# Patient Record
Sex: FEMALE | Race: WHITE | HISPANIC OR LATINO
[De-identification: names, ages, dates, MRNs, and addresses within clinical notes are randomized per-mention and may not be internally consistent; named-entity substitution may affect disease eponyms.]

---

## 2020-09-29 ENCOUNTER — APPOINTMENT (OUTPATIENT)
Dept: DERMATOLOGY | Facility: CLINIC | Age: 60
End: 2020-09-29
Payer: COMMERCIAL

## 2020-09-29 ENCOUNTER — TRANSCRIPTION ENCOUNTER (OUTPATIENT)
Age: 60
End: 2020-09-29

## 2020-09-29 PROCEDURE — 99203 OFFICE O/P NEW LOW 30 MIN: CPT | Mod: 95

## 2020-09-29 RX ORDER — BETAMETHASONE DIPROPIONATE 0.5 MG/G
0.05 OINTMENT, AUGMENTED TOPICAL
Qty: 1 | Refills: 0 | Status: ACTIVE | COMMUNITY
Start: 2020-09-29 | End: 1900-01-01

## 2020-11-09 ENCOUNTER — APPOINTMENT (OUTPATIENT)
Dept: DERMATOLOGY | Facility: CLINIC | Age: 60
End: 2020-11-09
Payer: COMMERCIAL

## 2020-11-09 PROCEDURE — 99213 OFFICE O/P EST LOW 20 MIN: CPT

## 2020-11-09 PROCEDURE — 99072 ADDL SUPL MATRL&STAF TM PHE: CPT

## 2020-11-09 RX ORDER — DICLOFENAC SODIUM 1% 10 MG/G
1 GEL TOPICAL
Qty: 100 | Refills: 0 | Status: ACTIVE | COMMUNITY
Start: 2020-05-21

## 2021-03-09 ENCOUNTER — NON-APPOINTMENT (OUTPATIENT)
Age: 61
End: 2021-03-09

## 2021-03-10 ENCOUNTER — APPOINTMENT (OUTPATIENT)
Dept: DERMATOLOGY | Facility: CLINIC | Age: 61
End: 2021-03-10
Payer: COMMERCIAL

## 2021-03-10 DIAGNOSIS — L63.9 ALOPECIA AREATA, UNSPECIFIED: ICD-10-CM

## 2021-03-10 DIAGNOSIS — L21.9 SEBORRHEIC DERMATITIS, UNSPECIFIED: ICD-10-CM

## 2021-03-10 PROCEDURE — 99214 OFFICE O/P EST MOD 30 MIN: CPT

## 2021-03-10 PROCEDURE — 99072 ADDL SUPL MATRL&STAF TM PHE: CPT

## 2021-07-22 ENCOUNTER — RX CHANGE (OUTPATIENT)
Age: 61
End: 2021-07-22

## 2021-09-01 DIAGNOSIS — M70.71 OTHER BURSITIS OF HIP, RIGHT HIP: ICD-10-CM

## 2021-09-01 DIAGNOSIS — G57.00 LESION OF SCIATIC NERVE, UNSPECIFIED LOWER LIMB: ICD-10-CM

## 2021-09-01 DIAGNOSIS — M25.569 PAIN IN UNSPECIFIED KNEE: ICD-10-CM

## 2021-09-01 DIAGNOSIS — M25.519 PAIN IN UNSPECIFIED SHOULDER: ICD-10-CM

## 2021-09-01 DIAGNOSIS — Z83.6 FAMILY HISTORY OF OTHER DISEASES OF THE RESPIRATORY SYSTEM: ICD-10-CM

## 2021-09-01 DIAGNOSIS — R51.9 HEADACHE, UNSPECIFIED: ICD-10-CM

## 2021-09-01 DIAGNOSIS — M54.2 CERVICALGIA: ICD-10-CM

## 2021-09-01 RX ORDER — OMEPRAZOLE 40 MG/1
40 CAPSULE, DELAYED RELEASE ORAL
Refills: 0 | Status: ACTIVE | COMMUNITY

## 2021-09-01 RX ORDER — OMEPRAZOLE 20 MG/1
20 CAPSULE, DELAYED RELEASE ORAL
Refills: 0 | Status: ACTIVE | COMMUNITY

## 2021-09-02 ENCOUNTER — APPOINTMENT (OUTPATIENT)
Dept: PHYSICAL MEDICINE AND REHAB | Facility: CLINIC | Age: 61
End: 2021-09-02
Payer: COMMERCIAL

## 2021-09-02 VITALS
BODY MASS INDEX: 30.9 KG/M2 | RESPIRATION RATE: 16 BRPM | DIASTOLIC BLOOD PRESSURE: 74 MMHG | WEIGHT: 181 LBS | OXYGEN SATURATION: 95 % | TEMPERATURE: 96.8 F | HEART RATE: 113 BPM | HEIGHT: 64 IN | SYSTOLIC BLOOD PRESSURE: 160 MMHG

## 2021-09-02 PROCEDURE — 99213 OFFICE O/P EST LOW 20 MIN: CPT

## 2021-09-04 NOTE — REVIEW OF SYSTEMS
[Muscle Pain] : muscle pain [Fever] : no fever [Eye Pain] : no eye pain [Earache] : no earache [Chest Pain] : no chest pain [Shortness Of Breath] : no shortness of breath [Abdominal Pain] : no abdominal pain [Dysuria] : no dysuria [Skin Rash] : no skin rash [Dizziness] : no dizziness [Anxiety] : no anxiety [Easy Bruising] : no tendency for easy bruising [de-identified] : thoracic mylopathy with LE spasticity and weakness.

## 2021-09-04 NOTE — HISTORY OF PRESENT ILLNESS
[FreeTextEntry1] : Leg spasms\par This is a follow up visit for a 59 year old female who is well know to the practice. 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 She has residual lower extremity weakness with bilateral lower extremity spasticity. The spasticity has been controlled with Baclofen 20 mg 1 po qid.  She has difficulty with walking. She uses bilateral KAFO and  bilateral lofstrand crutches to assist with ambulation.\par \par . abram larson  adjusted for ankle release+ discussed ergonomics of computer work station. 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 She recently received new TKAFOs due to weight loss by Prosthetics P+O with Abundio Yun.  She has difficulty with ambulation because the ankle components are lockes at 90 degress and does not provide dorsiflexion to stand not clear stairs. The foot plate is to the toes and effectively makes the kafo seem longer. She catches her toe on stairs because she cannot hike her hip high enough to clear.\par \par She had a spasm on the thigh. her neurologist trialed tizanidine 2 mg at night. Tizandine made her high.h\par She received a new MRI of the thoracic spine. The result si not availabel for my review. She was told that the cord was thinned at the point of surgery\par \par She still requires baclofen for spasticity control. \par \par She uses the orthotics throughout the home. She resorts to a wheelchair when she tired. \par

## 2021-09-04 NOTE — PHYSICAL EXAM
[FreeTextEntry1] : Neck:\par     no masses, thyromegaly, or abnormal cervical nodes. moderate spasm on the right paracervical muscles. negative spurlings. \par Thoracic spine: moderate spasm in the paraspinal muscles.\par     Lumbar mild spasm. forward flexion 0-70.\par Pulses:\par     pulses normal in all 4 extremities.  \par Extremities:\par     RUE:FAROM shoulder tender diffusely MS 4/5 FAROM, Muscle strength 4/5 (nl 5/5), reflexes 2/4 sensation intact to light touch, pinprick and proprioception  \par \par LUE:FAROM shoulder tender diffusely MS 4/5 FAROM, Muscle strength 4/5 (nl 5/5), reflexes 2/4 sensation intact to light touch, pinprick and proprioception  \par \par LLE: hip 0-50  ms 3/5  (nl 5/5)\par knee farom ms 3/5   (nl 5/5) reflex 2/4 well healed scar. no instability.\par ankle FAROM, Muscle strength 2/5 (nl 5/5), reflexes 2/4 sensation intact to light touch, pinprick and proprioception. Skin intact\par  negative FABERs, negative FADIRs, negative SLRs. Cassidy 3/4\par \par RLE: hip flexion 0-60 ms 3/5  (nl 5/5)\par knee flex 0-30 ms 3/5  (nl 5/5) . no instability. No swell + medial mcmurrays reflex 3/4\par ankle FAROM, Muscle strength 2/5  (nl 5/5) , reflexes 3/4 sensation intact to light touch, pinprick and proprioception. Skin intact.\par negative FABERs, negative FADIRs, negative SLRs Cassidy 2/4\par \par Neurologic:\par     Gait, She is distant supervision to rise from a chair. She thrusts herself forward.  She circumducts her legs to bring her legs forward.  She also uses right KAFO and left KAFO bilateral lofstrand crutches to assist with ambulation.\par

## 2021-09-20 ENCOUNTER — RX CHANGE (OUTPATIENT)
Age: 61
End: 2021-09-20

## 2022-05-01 ENCOUNTER — RX RENEWAL (OUTPATIENT)
Age: 62
End: 2022-05-01

## 2022-05-01 ENCOUNTER — NON-APPOINTMENT (OUTPATIENT)
Age: 62
End: 2022-05-01

## 2022-05-02 ENCOUNTER — APPOINTMENT (OUTPATIENT)
Dept: PHYSICAL MEDICINE AND REHAB | Facility: CLINIC | Age: 62
End: 2022-05-02
Payer: COMMERCIAL

## 2022-05-02 VITALS
RESPIRATION RATE: 16 BRPM | HEART RATE: 101 BPM | OXYGEN SATURATION: 97 % | BODY MASS INDEX: 31.58 KG/M2 | WEIGHT: 185 LBS | HEIGHT: 64 IN | TEMPERATURE: 97.8 F | SYSTOLIC BLOOD PRESSURE: 126 MMHG | DIASTOLIC BLOOD PRESSURE: 78 MMHG

## 2022-05-02 DIAGNOSIS — M76.32 ILIOTIBIAL BAND SYNDROME, LEFT LEG: ICD-10-CM

## 2022-05-02 PROCEDURE — 99213 OFFICE O/P EST LOW 20 MIN: CPT

## 2022-05-03 PROBLEM — M76.32 ILIOTIBIAL BAND SYNDROME OF LEFT SIDE: Status: ACTIVE | Noted: 2022-05-02

## 2022-05-03 NOTE — REVIEW OF SYSTEMS
[Muscle Pain] : muscle pain [Fever] : no fever [Eye Pain] : no eye pain [Earache] : no earache [Chest Pain] : no chest pain [Shortness Of Breath] : no shortness of breath [Abdominal Pain] : no abdominal pain [Dysuria] : no dysuria [Skin Rash] : no skin rash [Dizziness] : no dizziness [Anxiety] : no anxiety [Easy Bruising] : no tendency for easy bruising [de-identified] : thoracic mylopathy with LE spasticity and weakness.

## 2022-05-03 NOTE — HISTORY OF PRESENT ILLNESS
[FreeTextEntry1] : Leg spasms\par This is a follow up visit for a 62 year old female who is well know to the practice. 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 She has residual lower extremity weakness with bilateral lower extremity spasticity. The spasticity has been controlled with Baclofen 20 mg 1 po qid.  She has difficulty with walking. She uses bilateral KAFO and  bilateral lofstrand crutches to assist with ambulation. \par \par She is still having difficulties with the use of her bilateral TK AFO orthosis.  Many of her concerns have been addressed by macario Yun.  She still has a long footplate which prevents her from using her shoes.  It is taken a long time for her to adjust to the new braces.  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  The results are not available for my review. She was told that the cord was thinned at the point of surgery\par \par She has increased pain over the left lateral leg that starts in the hip and radiates to the knee.  The pain is worse with use of her prosthesis.  She notices the pain more at night.  Pain is 5/10\par \par She still requires baclofen for spasticity control.  She takes baclofen 20 mg 1 p.o. 4 times daily\par \par She uses the orthotics throughout the home. She resorts to a wheelchair when she tired.  She continues to be gainfully employed\par

## 2022-05-31 ENCOUNTER — RX RENEWAL (OUTPATIENT)
Age: 62
End: 2022-05-31

## 2022-05-31 RX ORDER — MELOXICAM 15 MG/1
15 TABLET ORAL
Qty: 30 | Refills: 0 | Status: ACTIVE | COMMUNITY
Start: 2022-05-02 | End: 1900-01-01

## 2022-06-02 ENCOUNTER — RX CHANGE (OUTPATIENT)
Age: 62
End: 2022-06-02

## 2022-06-02 RX ORDER — BACLOFEN 20 MG/1
20 TABLET ORAL
Qty: 120 | Refills: 5 | Status: DISCONTINUED | COMMUNITY
Start: 2021-06-28 | End: 2022-06-02

## 2022-11-02 ENCOUNTER — APPOINTMENT (OUTPATIENT)
Dept: PHYSICAL MEDICINE AND REHAB | Facility: CLINIC | Age: 62
End: 2022-11-02

## 2022-11-02 VITALS
RESPIRATION RATE: 18 BRPM | DIASTOLIC BLOOD PRESSURE: 80 MMHG | TEMPERATURE: 97.6 F | BODY MASS INDEX: 31.58 KG/M2 | SYSTOLIC BLOOD PRESSURE: 118 MMHG | OXYGEN SATURATION: 97 % | HEIGHT: 64 IN | WEIGHT: 185 LBS

## 2022-11-02 PROCEDURE — 99213 OFFICE O/P EST LOW 20 MIN: CPT

## 2022-11-02 RX ORDER — BACLOFEN 20 MG/1
20 TABLET ORAL
Qty: 120 | Refills: 6 | Status: DISCONTINUED | COMMUNITY
Start: 2021-09-02 | End: 2022-11-02

## 2022-11-02 RX ORDER — BACLOFEN 20 MG/1
20 TABLET ORAL
Qty: 120 | Refills: 0 | Status: DISCONTINUED | COMMUNITY
Start: 2020-05-14 | End: 2022-11-02

## 2022-11-06 NOTE — REVIEW OF SYSTEMS
[Muscle Pain] : muscle pain [Fever] : no fever [Eye Pain] : no eye pain [Earache] : no earache [Chest Pain] : no chest pain [Shortness Of Breath] : no shortness of breath [Abdominal Pain] : no abdominal pain [Dysuria] : no dysuria [Skin Rash] : no skin rash [Dizziness] : no dizziness [Anxiety] : no anxiety [Easy Bruising] : no tendency for easy bruising [de-identified] : thoracic mylopathy with LE spasticity and weakness.

## 2022-11-06 NOTE — HISTORY OF PRESENT ILLNESS
[FreeTextEntry1] : The patient follows up for renewal of her medications for control of low extremity spasticity and to evaluate her new TKAFOs\par \par This is a follow up visit for a 62 year old female who is well know to the practice. 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 She has residual lower extremity weakness with bilateral lower extremity spasticity. The spasticity has been controlled with Baclofen 20 mg 1 po qid.  She has difficulty with walking. She uses bilateral KAFO and  bilateral lofstrand crutches to assist with ambulation. \par \par She has new TKAFOs.  The new braces provide better support.  She no longer has knock knee. \par \par She requires restorative physical therapy to acclimate her to her new braces she is in restorative physical therapy therapy 2 times a week. week exercises include Bridges, adductor exercises,  knee extensions over a pilon.  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 \par \par She received a new MRI of the thoracic spine.  The results are not available for my review. She was told that the cord was thinned at the point of surgery.  No further surgery is anticipated\par \par She still requires baclofen for spasticity control.  She takes baclofen 20 mg 1 p.o. 4 times daily\par \par She uses the orthotics throughout the home. She resorts to a wheelchair when she tired.  She continues to be gainfully employed\par \par

## 2022-11-06 NOTE — PHYSICAL EXAM
[FreeTextEntry1] : Neck:\par     no masses, thyromegaly, or abnormal cervical nodes. moderate spasm on the right paracervical muscles. negative spurlings. \par Thoracic spine: moderate spasm in the paraspinal muscles.\par     Lumbar mild spasm. forward flexion 0-70.\par Pulses:\par     pulses normal in all 4 extremities.  \par Extremities:\par     RUE:FAROM shoulder tender diffusely MS 4/5 FAROM, Muscle strength 4/5 (nl 5/5), reflexes 2/4 sensation intact to light touch, pinprick and proprioception  \par \par LUE:FAROM shoulder tender diffusely MS 4/5 FAROM, Muscle strength 4/5 (nl 5/5), reflexes 2/4 sensation intact to light touch, pinprick and proprioception  \par \par LLE: hip 0-50  ms 3/5  (nl 5/5)\par knee farom ms 3/5   (nl 5/5) reflex 2/4 well healed scar. no instability.\par ankle FAROM, Muscle strength 2/5 (nl 5/5), reflexes 2/4 sensation intact to light touch, pinprick and proprioception. Skin intact\par  negative FABERs, negative FADIRs, negative SLRs. Cassidy 2/4\par \par RLE: hip flexion 0-60 ms 3/5  (nl 5/5)\par knee flex 0-30 ms 3/5  (nl 5/5) . no instability. No swell + medial mcmurrays reflex 3/4\par ankle FAROM, Muscle strength 2/5  (nl 5/5) , reflexes 3/4 sensation intact to light touch, pinprick and proprioception. Skin intact.\par negative FABERs, negative FADIRs, negative SLRs Cassidy 2/4\par \par Neurologic:\par     Gait, She is distant supervision to rise from a chair. She thrusts herself forward.  She is better able to use her hip flexors to clear her feet during swing through phase.  She wears bilateral KAFOs and uses bilateral lofstrand crutches to assist with ambulation.\par

## 2023-01-17 ENCOUNTER — NON-APPOINTMENT (OUTPATIENT)
Age: 63
End: 2023-01-17

## 2023-01-18 ENCOUNTER — APPOINTMENT (OUTPATIENT)
Dept: DERMATOLOGY | Facility: CLINIC | Age: 63
End: 2023-01-18
Payer: COMMERCIAL

## 2023-01-18 DIAGNOSIS — L65.0 TELOGEN EFFLUVIUM: ICD-10-CM

## 2023-01-18 DIAGNOSIS — L64.9 ANDROGENIC ALOPECIA, UNSPECIFIED: ICD-10-CM

## 2023-01-18 DIAGNOSIS — L21.9 SEBORRHEIC DERMATITIS, UNSPECIFIED: ICD-10-CM

## 2023-01-18 PROCEDURE — 99214 OFFICE O/P EST MOD 30 MIN: CPT

## 2023-01-18 RX ORDER — KETOCONAZOLE 20.5 MG/ML
2 SHAMPOO, SUSPENSION TOPICAL
Qty: 1 | Refills: 11 | Status: ACTIVE | COMMUNITY
Start: 2020-11-09 | End: 1900-01-01

## 2023-05-31 ENCOUNTER — APPOINTMENT (OUTPATIENT)
Dept: PHYSICAL MEDICINE AND REHAB | Facility: CLINIC | Age: 63
End: 2023-05-31
Payer: COMMERCIAL

## 2023-05-31 VITALS
TEMPERATURE: 98 F | HEIGHT: 64 IN | HEART RATE: 120 BPM | OXYGEN SATURATION: 98 % | RESPIRATION RATE: 17 BRPM | SYSTOLIC BLOOD PRESSURE: 120 MMHG | DIASTOLIC BLOOD PRESSURE: 80 MMHG

## 2023-05-31 DIAGNOSIS — M25.561 PAIN IN RIGHT KNEE: ICD-10-CM

## 2023-05-31 PROCEDURE — 99214 OFFICE O/P EST MOD 30 MIN: CPT

## 2023-06-04 PROBLEM — M25.561 ACUTE PAIN OF RIGHT KNEE: Status: ACTIVE | Noted: 2023-05-31

## 2023-06-04 NOTE — PHYSICAL EXAM
[FreeTextEntry1] : Neck:\par     no masses, thyromegaly, or abnormal cervical nodes. moderate spasm on the right paracervical muscles. negative spurlings. \par Thoracic spine: moderate spasm in the paraspinal muscles.\par     Lumbar mild spasm. forward flexion 0-70.\par Pulses:\par     pulses normal in all 4 extremities.  \par Extremities:\par     RUE:FAROM shoulder tender diffusely MS 4/5 FAROM, Muscle strength 4/5 (nl 5/5), reflexes 2/4 sensation intact to light touch, pinprick and proprioception  \par \par LUE:FAROM shoulder tender diffusely MS 4/5 FAROM, Muscle strength 4/5 (nl 5/5), reflexes 2/4 sensation intact to light touch, pinprick and proprioception  \par \par LLE: hip 0-50  ms 3/5  (nl 5/5)\par knee farom ms 3/5   (nl 5/5) reflex 2/4 well healed scar. no instability.\par ankle FAROM, Muscle strength 2/5 (nl 5/5), reflexes 2/4 sensation intact to light touch, pinprick and proprioception. Skin intact\par  negative FABERs, negative FADIRs, negative SLRs. Cassidy 2/4\par \par RLE: hip flexion 0-60 ms 3/5  (nl 5/5)\par knee flex 0-30 ms 3/5  (nl 5/5) . no instability. No swell + medial mcmurrays she has tenderness over the medial knee at the point of a palpable screw head.  Reflex 3/4\par ankle FAROM, Muscle strength 2/5  (nl 5/5) , reflexes 3/4 sensation intact to light touch, pinprick and proprioception. Skin intact.\par negative FABERs, negative FADIRs, negative SLRs Cassidy 2/4\par \par Neurologic:\par     Gait, She is distant contact-guard to rise from a chair. She thrusts herself forward.  She has difficulty with crossing her legs forward.  She wears bilateral KAFOs and uses bilateral lofstrand crutches to assist with ambulation.\par

## 2023-06-04 NOTE — HISTORY OF PRESENT ILLNESS
[FreeTextEntry1] : The patient follows up for renewal of her medications for control of low extremity spasticity and for prescription to modify her present T KAFOs\par \par This is a follow up visit for a 63 year old female who is well know to the practice. 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 She has residual lower extremity weakness with bilateral lower extremity spasticity. The spasticity has been controlled with Baclofen 20 mg 1 po qid.  She has difficulty with walking. She uses bilateral T KAFO and  bilateral lofstrand crutches to assist with ambulation. \par \par She received a new MRI of the thoracic spine.  The results are not available for my review. She was told that the cord was thinned at the point of surgery.  No further surgery is anticipated\par \par She still requires baclofen for spasticity control.  She takes baclofen 20 mg 1 p.o. 4 times daily\par \par She uses the orthotics throughout the home. She resorts to a wheelchair when she tired.  She continues to be gainfully employed\par \par \par She has difficulty with walking especially going up stairs.  Rising out of the chair has been more difficult recently.  She denies increased back pain.\par \par She has pain over the right knee.  This is a site of a surgical repair of fracture.  She feels a screw coming through the skin.  Skin is intact\par \par

## 2023-06-04 NOTE — REVIEW OF SYSTEMS
[Muscle Pain] : muscle pain [Fever] : no fever [Eye Pain] : no eye pain [Earache] : no earache [Chest Pain] : no chest pain [Shortness Of Breath] : no shortness of breath [Abdominal Pain] : no abdominal pain [Dysuria] : no dysuria [Skin Rash] : no skin rash [Dizziness] : no dizziness [Anxiety] : no anxiety [Easy Bruising] : no tendency for easy bruising [de-identified] : thoracic mylopathy with LE spasticity and weakness.

## 2023-07-25 ENCOUNTER — APPOINTMENT (OUTPATIENT)
Dept: PHYSICAL MEDICINE AND REHAB | Facility: CLINIC | Age: 63
End: 2023-07-25
Payer: COMMERCIAL

## 2023-07-25 VITALS
BODY MASS INDEX: 31.58 KG/M2 | HEART RATE: 107 BPM | WEIGHT: 185 LBS | SYSTOLIC BLOOD PRESSURE: 122 MMHG | HEIGHT: 64 IN | TEMPERATURE: 97.8 F | OXYGEN SATURATION: 97 % | RESPIRATION RATE: 17 BRPM | DIASTOLIC BLOOD PRESSURE: 70 MMHG

## 2023-07-25 PROCEDURE — 99214 OFFICE O/P EST MOD 30 MIN: CPT

## 2023-07-27 NOTE — PHYSICAL EXAM
[FreeTextEntry1] : Neck:\par     no masses, thyromegaly, or abnormal cervical nodes. moderate spasm on the right paracervical muscles. negative spurlings. \par Thoracic spine: moderate spasm in the paraspinal muscles.  Right worse than left\par     Lumbar mild spasm. forward flexion 0-70.\par Pulses:\par     pulses normal in all 4 extremities.  \par Extremities:\par     RUE:FAROM shoulder tender diffusely MS 4/5 FAROM, Muscle strength 4/5 (nl 5/5), reflexes 2/4 sensation intact to light touch, pinprick and proprioception  \par \par LUE:FAROM shoulder tender diffusely MS 4/5 FAROM, Muscle strength 4/5 (nl 5/5), reflexes 2/4 sensation intact to light touch, pinprick and proprioception  \par \par LLE: hip 0-50  ms 3/5  (nl 5/5)\par knee farom ms 3/5   (nl 5/5) reflex 2/4 well healed scar. no instability.\par ankle FAROM, Muscle strength 2/5 (nl 5/5), reflexes 2/4 sensation intact to light touch, pinprick and proprioception. Skin intact\par  negative FABERs, negative FADIRs, negative SLRs. Cassidy 2/4\par \par RLE: hip flexion 0-60 ms 3/5  (nl 5/5)\par knee flex 0-30 ms 3/5  (nl 5/5) . no instability. No swell + medial mcmurrays she has tenderness over the medial knee at the point of a palpable screw head.  Skin is intact reflex 3/4\par ankle FAROM, Muscle strength 2/5  (nl 5/5) , reflexes 3/4 sensation intact to light touch, pinprick and proprioception. Skin intact.\par negative FABERs, negative FADIRs, negative SLRs Cassidy 2/4\par \par Neurologic:\par     Gait, She is distant contact-guard to rise from a chair. She thrusts herself forward.  She has difficulty with crossing her legs forward.  She wears bilateral KAFOs and uses bilateral lofstrand crutches to assist with ambulation.  She has difficulty with hip flexion and is circumducting her legs to advance her steps\par

## 2023-07-27 NOTE — REVIEW OF SYSTEMS
[Muscle Pain] : muscle pain [Fever] : no fever [Eye Pain] : no eye pain [Earache] : no earache [Chest Pain] : no chest pain [Shortness Of Breath] : no shortness of breath [Abdominal Pain] : no abdominal pain [Dysuria] : no dysuria [Skin Rash] : no skin rash [Dizziness] : no dizziness [Anxiety] : no anxiety [Easy Bruising] : no tendency for easy bruising [de-identified] : thoracic mylopathy with LE spasticity and weakness.

## 2023-07-27 NOTE — HISTORY OF PRESENT ILLNESS
[FreeTextEntry1] : The patient follows up for renewal of her medications for control of low extremity spasticity and for prescription to modify her present T KAFOs\par \par This is a follow up visit for a 63 year old female who is well know to the practice. 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 She has residual lower extremity weakness with bilateral lower extremity spasticity. The spasticity has been controlled with Baclofen 20 mg 1 po qid.  She has difficulty with walking. She uses bilateral T KAFO and  bilateral lofstrand crutches to assist with ambulation. \par \par She received a new MRI of the thoracic spine.  The results are not available for my review. She was told that the cord was thinned at the point of surgery.  No further surgery is anticipated\par \par She still requires baclofen for spasticity control.  She takes baclofen 20 mg 1 p.o. 4 times daily\par \par She had an exacerbation of back pain June 2023\par She developed pain from the right shoulder to the right hip to the knee.  She had weakness of the knee.\par She went to therapy/ chiropracter.  Her pain has been decreasing. \par Her right kafo was too loose . She has been dragging the right brace.  The excessive work of dragging the right prosthesis is aggravated her back pain she followed up with P+O. The orthotist adjusted the straps. She needed to pay for housekeeprs to perform her chores. She has been restricting her activities including ADLs and ambulation to allow her right side pain to resolve. She needs lighter T KAFOs.  The T KAFOs have to be a compromise to allow for a balance of control of the hip and knees and ankle and the lighter weight.  As she ages she finds that she is unable to propel the heavy braces\par \par She uses the orthotics throughout the home. She resorts to a wheelchair when she tired.  She continues to be gainfully employed\par \par \par She has difficulty with walking especially going up stairs.  Rising out of the chair has been more difficult recently.  She denies increased back pain.\par \par She has pain over the right knee.  This is a site of a surgical repair of fracture.  She feels a screw coming through the skin.  Skin is intact\par \par

## 2023-08-24 ENCOUNTER — APPOINTMENT (OUTPATIENT)
Dept: PHYSICAL MEDICINE AND REHAB | Facility: CLINIC | Age: 63
End: 2023-08-24

## 2023-09-25 ENCOUNTER — APPOINTMENT (OUTPATIENT)
Dept: PHYSICAL MEDICINE AND REHAB | Facility: CLINIC | Age: 63
End: 2023-09-25
Payer: COMMERCIAL

## 2023-09-25 VITALS
HEART RATE: 122 BPM | WEIGHT: 185 LBS | TEMPERATURE: 97.6 F | OXYGEN SATURATION: 98 % | RESPIRATION RATE: 17 BRPM | BODY MASS INDEX: 31.58 KG/M2 | HEIGHT: 64 IN | SYSTOLIC BLOOD PRESSURE: 138 MMHG | DIASTOLIC BLOOD PRESSURE: 74 MMHG

## 2023-09-25 DIAGNOSIS — Z02.6 ENCOUNTER FOR EXAMINATION FOR INSURANCE PURPOSES: ICD-10-CM

## 2023-09-25 PROCEDURE — 99213 OFFICE O/P EST LOW 20 MIN: CPT

## 2023-10-02 PROBLEM — Z02.6 ENCOUNTER FOR EXAMINATION FOR INSURANCE PURPOSES: Status: ACTIVE | Noted: 2023-10-02

## 2023-11-01 ENCOUNTER — APPOINTMENT (OUTPATIENT)
Dept: PHYSICAL MEDICINE AND REHAB | Facility: CLINIC | Age: 63
End: 2023-11-01

## 2023-11-09 ENCOUNTER — APPOINTMENT (OUTPATIENT)
Dept: PHYSICAL MEDICINE AND REHAB | Facility: CLINIC | Age: 63
End: 2023-11-09
Payer: COMMERCIAL

## 2023-11-09 VITALS — HEART RATE: 99 BPM | DIASTOLIC BLOOD PRESSURE: 86 MMHG | SYSTOLIC BLOOD PRESSURE: 143 MMHG | OXYGEN SATURATION: 99 %

## 2023-11-09 PROCEDURE — 99214 OFFICE O/P EST MOD 30 MIN: CPT

## 2023-12-21 ENCOUNTER — NON-APPOINTMENT (OUTPATIENT)
Age: 63
End: 2023-12-21

## 2023-12-21 ENCOUNTER — APPOINTMENT (OUTPATIENT)
Dept: PHYSICAL MEDICINE AND REHAB | Facility: CLINIC | Age: 63
End: 2023-12-21
Payer: COMMERCIAL

## 2023-12-21 VITALS
DIASTOLIC BLOOD PRESSURE: 70 MMHG | SYSTOLIC BLOOD PRESSURE: 142 MMHG | HEIGHT: 64 IN | HEART RATE: 92 BPM | RESPIRATION RATE: 18 BRPM | WEIGHT: 180 LBS | OXYGEN SATURATION: 99 % | BODY MASS INDEX: 30.73 KG/M2

## 2023-12-21 DIAGNOSIS — G82.20 PARAPLEGIA, UNSPECIFIED: ICD-10-CM

## 2023-12-21 DIAGNOSIS — R26.9 UNSPECIFIED ABNORMALITIES OF GAIT AND MOBILITY: ICD-10-CM

## 2023-12-21 PROCEDURE — 99213 OFFICE O/P EST LOW 20 MIN: CPT

## 2023-12-21 RX ORDER — BACLOFEN 20 MG/1
20 TABLET ORAL
Qty: 360 | Refills: 3 | Status: ACTIVE | COMMUNITY
Start: 2022-06-02 | End: 1900-01-01

## 2023-12-21 NOTE — HISTORY OF PRESENT ILLNESS
[FreeTextEntry1] : The patient follows up in the office today stating that she had adjustments to her present KAFOs.  The braces no longer provide support.  The thigh cuff is causing chafing.  The knee component is causing chafing.  This is a follow up visit for a 63 year old female who is well know to the practice. 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 She has residual lower extremity weakness with bilateral lower extremity spasticity. The spasticity has been controlled with Baclofen 20 mg 1 po qid.  She has difficulty with walking. She uses bilateral T KAFO and  bilateral lofstrand crutches to assist with ambulation.   She received a new MRI of the thoracic spine.  The results are not available for my review. She was told that the cord was thinned at the point of surgery.  No further surgery is anticipated  She lost 25 lbs and the prosthesis no longer support the legs. She has chafing of the skin. She had the Orthotics modified. She still has poor control of the braces. She has chafing at the thigh/knee. She has nearly fallen; She is getting older and feels her legs are little weaker. She finds her presents othotics are too heavy and she is unble to clear her feet. She has more difficulty with going up stairs. She needs to use her hands to flex her hips.   She has been trying to increase her mobility> She has increased pain with tingling in legs.  She still requires baclofen for spasticity control.  She takes baclofen 20 mg 1 p.o. 4 times daily  Her right kafo was too loose . She has been dragging the right brace.  The excessive work of dragging the right prosthesis is aggravated her back pain she followed up with P+O. The orthotist adjusted the straps. She needed to pay for housekeeprs to perform her chores. She has been restricting her activities including ADLs and ambulation to allow her right side pain to resolve. She needs lighter T KAFOs.  The T KAFOs have to be a compromise to allow for a balance of control of the hip and knees and ankle and the lighter weight.  As she ages she finds that she is unable to propel the heavy braces  She uses the orthotics throughout the home. She resorts to a wheelchair when she tired.  She continues to be gainfully employed  She has difficulty with walking especially going up stairs.  Rising out of the chair has been more difficult recently.  She denies increased back pain.  She has less pain over the knee. The prosthetist fixed the exposed screw. Her new prostheses are being fabricated. The prostheses will be 50 % lighter.   She has been attending therapy 1 x week. She pays for this  out of pocket.

## 2023-12-21 NOTE — REVIEW OF SYSTEMS
[Muscle Pain] : muscle pain [Fever] : no fever [Eye Pain] : no eye pain [Earache] : no earache [Chest Pain] : no chest pain [Abdominal Pain] : no abdominal pain [Shortness Of Breath] : no shortness of breath [Dysuria] : no dysuria [Skin Rash] : no skin rash [Dizziness] : no dizziness [Anxiety] : no anxiety [Easy Bruising] : no tendency for easy bruising [de-identified] : thoracic mylopathy with LE spasticity and weakness.

## 2023-12-21 NOTE — PHYSICAL EXAM
[FreeTextEntry1] : Neck:     no masses, thyromegaly, or abnormal cervical nodes. moderate spasm on the right paracervical muscles. negative spurlings.  Thoracic spine: moderate spasm in the paraspinal muscles.  Right worse than left     Lumbar mild spasm. forward flexion 0-70. Pulses:     pulses normal in all 4 extremities.   Extremities:     RUE:FAROM shoulder tender diffusely MS 4-/5 FAROM, Muscle strength 4/5 (nl 5/5), reflexes 2/4 sensation intact to light touch, pinprick and proprioception    LUE:FAROM shoulder tender diffusely MS 4-/5 FAROM, Muscle strength 4/5 (nl 5/5), reflexes 2/4 sensation intact to light touch, pinprick and proprioception    LLE: hip 0-50  ms 3/5  (nl 5/5).  Right thigh is tender medially. knee farom ms 3/5   (nl 5/5) reflex 2/4 well healed scar. Valgus deformity .Right knee has tenderness medially. ankle FAROM, Muscle strength 2-/5 (nl 5/5), reflexes 2/4 sensation intact to light touch, pinprick and proprioception. Skin intact  negative FABERs, negative FADIRs, negative SLRs. Cassidy 2/4  RLE: hip flexion 0-60 ms 3/5  (nl 5/5)Tenderness on palpation of the medial thigh. knee flex 0-30 ms 3/5  (nl 5/5) . no instability. No swell + medial mcmurrays she has tenderness over the medial knee at the point of a palpable screw head. Valgus deformity Skin is intact reflex 3/4 ankle FAROM, Muscle strength 2-/5  (nl 5/5) , reflexes 3/4 sensation intact to light touch, pinprick and proprioception. Skin intact. negative FABERs, negative FADIRs, negative SLRs Cassidy 2/4  Neurologic:     Gait, She is distant contact-guard to rise from a chair. She thrusts herself forward.  She has difficulty with bringing her legs forward.  She wears bilateral KAFOs and uses bilateral lofstrand crutches to assist with ambulation.  She has difficulty with hip flexion and is circumducting her legs to advance her steps.  The orthotics piston with heel strike

## 2024-01-16 ENCOUNTER — INPATIENT (INPATIENT)
Facility: HOSPITAL | Age: 64
LOS: 7 days | Discharge: HOME | End: 2024-01-24
Attending: INTERNAL MEDICINE | Admitting: INTERNAL MEDICINE
Payer: COMMERCIAL

## 2024-01-16 VITALS
DIASTOLIC BLOOD PRESSURE: 68 MMHG | OXYGEN SATURATION: 99 % | RESPIRATION RATE: 16 BRPM | HEART RATE: 93 BPM | TEMPERATURE: 98 F | SYSTOLIC BLOOD PRESSURE: 107 MMHG

## 2024-01-16 DIAGNOSIS — Z98.89 OTHER SPECIFIED POSTPROCEDURAL STATES: Chronic | ICD-10-CM

## 2024-01-16 DIAGNOSIS — I47.10 SUPRAVENTRICULAR TACHYCARDIA, UNSPECIFIED: ICD-10-CM

## 2024-01-16 LAB
ALBUMIN SERPL ELPH-MCNC: 3.4 G/DL — SIGNIFICANT CHANGE UP (ref 3.3–5)
ALP SERPL-CCNC: 100 U/L — SIGNIFICANT CHANGE UP (ref 40–120)
ALT FLD-CCNC: 12 U/L — SIGNIFICANT CHANGE UP (ref 4–33)
ANION GAP SERPL CALC-SCNC: 8 MMOL/L — SIGNIFICANT CHANGE UP (ref 7–14)
APTT BLD: 28.7 SEC — SIGNIFICANT CHANGE UP (ref 24.5–35.6)
AST SERPL-CCNC: 17 U/L — SIGNIFICANT CHANGE UP (ref 4–32)
BASOPHILS # BLD AUTO: 0.02 K/UL — SIGNIFICANT CHANGE UP (ref 0–0.2)
BASOPHILS NFR BLD AUTO: 0.2 % — SIGNIFICANT CHANGE UP (ref 0–2)
BILIRUB SERPL-MCNC: <0.2 MG/DL — SIGNIFICANT CHANGE UP (ref 0.2–1.2)
BUN SERPL-MCNC: 18 MG/DL — SIGNIFICANT CHANGE UP (ref 7–23)
CALCIUM SERPL-MCNC: 8.5 MG/DL — SIGNIFICANT CHANGE UP (ref 8.4–10.5)
CHLORIDE SERPL-SCNC: 107 MMOL/L — SIGNIFICANT CHANGE UP (ref 98–107)
CO2 SERPL-SCNC: 27 MMOL/L — SIGNIFICANT CHANGE UP (ref 22–31)
CREAT SERPL-MCNC: 0.86 MG/DL — SIGNIFICANT CHANGE UP (ref 0.5–1.3)
EGFR: 77 ML/MIN/1.73M2 — SIGNIFICANT CHANGE UP
EOSINOPHIL # BLD AUTO: 0.05 K/UL — SIGNIFICANT CHANGE UP (ref 0–0.5)
EOSINOPHIL NFR BLD AUTO: 0.6 % — SIGNIFICANT CHANGE UP (ref 0–6)
GLUCOSE SERPL-MCNC: 92 MG/DL — SIGNIFICANT CHANGE UP (ref 70–99)
HCT VFR BLD CALC: 37.1 % — SIGNIFICANT CHANGE UP (ref 34.5–45)
HGB BLD-MCNC: 11.5 G/DL — SIGNIFICANT CHANGE UP (ref 11.5–15.5)
IANC: 6.03 K/UL — SIGNIFICANT CHANGE UP (ref 1.8–7.4)
IMM GRANULOCYTES NFR BLD AUTO: 0.4 % — SIGNIFICANT CHANGE UP (ref 0–0.9)
INR BLD: 1.03 RATIO — SIGNIFICANT CHANGE UP (ref 0.85–1.18)
LYMPHOCYTES # BLD AUTO: 2.11 K/UL — SIGNIFICANT CHANGE UP (ref 1–3.3)
LYMPHOCYTES # BLD AUTO: 23.5 % — SIGNIFICANT CHANGE UP (ref 13–44)
MAGNESIUM SERPL-MCNC: 1.8 MG/DL — SIGNIFICANT CHANGE UP (ref 1.6–2.6)
MCHC RBC-ENTMCNC: 25.4 PG — LOW (ref 27–34)
MCHC RBC-ENTMCNC: 31 GM/DL — LOW (ref 32–36)
MCV RBC AUTO: 81.9 FL — SIGNIFICANT CHANGE UP (ref 80–100)
MONOCYTES # BLD AUTO: 0.73 K/UL — SIGNIFICANT CHANGE UP (ref 0–0.9)
MONOCYTES NFR BLD AUTO: 8.1 % — SIGNIFICANT CHANGE UP (ref 2–14)
NEUTROPHILS # BLD AUTO: 6.03 K/UL — SIGNIFICANT CHANGE UP (ref 1.8–7.4)
NEUTROPHILS NFR BLD AUTO: 67.2 % — SIGNIFICANT CHANGE UP (ref 43–77)
NRBC # BLD: 0 /100 WBCS — SIGNIFICANT CHANGE UP (ref 0–0)
NRBC # FLD: 0 K/UL — SIGNIFICANT CHANGE UP (ref 0–0)
PLATELET # BLD AUTO: 251 K/UL — SIGNIFICANT CHANGE UP (ref 150–400)
POTASSIUM SERPL-MCNC: 3.9 MMOL/L — SIGNIFICANT CHANGE UP (ref 3.5–5.3)
POTASSIUM SERPL-SCNC: 3.9 MMOL/L — SIGNIFICANT CHANGE UP (ref 3.5–5.3)
PROT SERPL-MCNC: 6.2 G/DL — SIGNIFICANT CHANGE UP (ref 6–8.3)
PROTHROM AB SERPL-ACNC: 11.5 SEC — SIGNIFICANT CHANGE UP (ref 9.5–13)
RBC # BLD: 4.53 M/UL — SIGNIFICANT CHANGE UP (ref 3.8–5.2)
RBC # FLD: 13.3 % — SIGNIFICANT CHANGE UP (ref 10.3–14.5)
SODIUM SERPL-SCNC: 142 MMOL/L — SIGNIFICANT CHANGE UP (ref 135–145)
TROPONIN T, HIGH SENSITIVITY RESULT: 108 NG/L — CRITICAL HIGH
TROPONIN T, HIGH SENSITIVITY RESULT: 118 NG/L — CRITICAL HIGH
TSH SERPL-MCNC: 2.14 UIU/ML — SIGNIFICANT CHANGE UP (ref 0.27–4.2)
WBC # BLD: 8.98 K/UL — SIGNIFICANT CHANGE UP (ref 3.8–10.5)
WBC # FLD AUTO: 8.98 K/UL — SIGNIFICANT CHANGE UP (ref 3.8–10.5)

## 2024-01-16 PROCEDURE — 99285 EMERGENCY DEPT VISIT HI MDM: CPT

## 2024-01-16 PROCEDURE — 99223 1ST HOSP IP/OBS HIGH 75: CPT

## 2024-01-16 PROCEDURE — 93010 ELECTROCARDIOGRAM REPORT: CPT | Mod: 76

## 2024-01-16 PROCEDURE — 71046 X-RAY EXAM CHEST 2 VIEWS: CPT | Mod: 26

## 2024-01-16 RX ORDER — METOPROLOL TARTRATE 50 MG
25 TABLET ORAL DAILY
Refills: 0 | Status: DISCONTINUED | OUTPATIENT
Start: 2024-01-16 | End: 2024-01-20

## 2024-01-16 RX ORDER — BACLOFEN 100 %
20 POWDER (GRAM) MISCELLANEOUS ONCE
Refills: 0 | Status: COMPLETED | OUTPATIENT
Start: 2024-01-16 | End: 2024-01-16

## 2024-01-16 RX ORDER — POTASSIUM CHLORIDE 20 MEQ
20 PACKET (EA) ORAL ONCE
Refills: 0 | Status: DISCONTINUED | OUTPATIENT
Start: 2024-01-16 | End: 2024-01-17

## 2024-01-16 RX ORDER — ACETAMINOPHEN 500 MG
650 TABLET ORAL EVERY 6 HOURS
Refills: 0 | Status: DISCONTINUED | OUTPATIENT
Start: 2024-01-16 | End: 2024-01-24

## 2024-01-16 RX ORDER — ENOXAPARIN SODIUM 100 MG/ML
40 INJECTION SUBCUTANEOUS EVERY 24 HOURS
Refills: 0 | Status: DISCONTINUED | OUTPATIENT
Start: 2024-01-16 | End: 2024-01-24

## 2024-01-16 RX ORDER — MAGNESIUM SULFATE 500 MG/ML
1 VIAL (ML) INJECTION ONCE
Refills: 0 | Status: COMPLETED | OUTPATIENT
Start: 2024-01-16 | End: 2024-01-16

## 2024-01-16 RX ORDER — LANOLIN ALCOHOL/MO/W.PET/CERES
3 CREAM (GRAM) TOPICAL AT BEDTIME
Refills: 0 | Status: DISCONTINUED | OUTPATIENT
Start: 2024-01-16 | End: 2024-01-24

## 2024-01-16 RX ORDER — BACLOFEN 100 %
20 POWDER (GRAM) MISCELLANEOUS
Refills: 0 | Status: DISCONTINUED | OUTPATIENT
Start: 2024-01-16 | End: 2024-01-24

## 2024-01-16 RX ORDER — BACLOFEN 100 %
1 POWDER (GRAM) MISCELLANEOUS
Refills: 0 | DISCHARGE

## 2024-01-16 RX ADMIN — Medication 20 MILLIGRAM(S): at 19:48

## 2024-01-16 NOTE — H&P ADULT - PROBLEM SELECTOR PLAN 1
-200s per patient. EKG in chart from [facility] SVT to 188bpm. S/p adenosine and now NSR  -tele  -echo  -EP c/s in AM  -troponin 108--118 likely in setting of demand ischemia   -add on CKMB  -repeat troponin/CKMB  -start metoprolol 25mg qd with hold parameters. Patient notes chronically low BP 90/60s at baseline -200s per patient. [place] [facility] [place] 188[place]pm. S/p adenosine and now NSR  -tele  -echo  -EP c/s in AM  -troponin 108--118 likely in setting of demand ischemia. She does not have CP, LH, palpitations at this time. She is asymptomatic.  -add on CKMB  -repeat troponin/CKMB  -start metoprolol 25mg qd with hold parameters. Patient notes chronically low BP 90/60s at baseline  -discussed elevated troponins with Dr. Angela and recommended against ACS treatment at this time. Repeat pending -200s per patient. [place] [facility] [place] to 188bpm. S/p adenosine and now NSR  -tele  -echo  -EP c/s in AM  -troponin 108--118 likely in setting of demand ischemia. She does not have CP, LH, palpitations at this time. She is asymptomatic.  -add on CKMB  -repeat troponin/CKMB  -start metoprolol 25mg qd with hold parameters. Patient notes chronically low BP 90/60s at baseline  -discussed elevated troponins with Dr. Angela and recommended against ACS treatment at this time. Repeat 109 and stable. Asymptomatic -200s per patient. E[facility] SVT to 188bpm. S/p adenosine and now NSR  -tele  -echo  -EP c/s in AM  -troponin 108--118 likely in setting of demand ischemia. She does not have CP, LH, palpitations at this time. She is asymptomatic.  -add on CKMB  -repeat troponin/CKMB  -start metoprolol 25mg qd with hold parameters. Patient notes chronically low BP 90/60s at baseline  -discussed elevated troponins with Dr. Angela and recommended against ACS treatment at this time. Repeat 109 and stable. Asymptomatic  -keep K>4 and Mg>2

## 2024-01-16 NOTE — H&P ADULT - NSHPPHYSICALEXAM_GEN_ALL_CORE
PHYSICAL EXAM:  Vital Signs Last 24 Hrs  T(C): 36.7 (01-16-24 @ 22:57)  T(F): 98 (01-16-24 @ 22:57), Max: 98.5 (01-16-24 @ 16:15)  HR: 77 (01-16-24 @ 22:57) (74 - 93)  BP: 107/44 (01-16-24 @ 22:57)  BP(mean): --  RR: 17 (01-16-24 @ 22:57) (16 - 17)  SpO2: 97% (01-16-24 @ 22:57) (97% - 100%)  Wt(kg): --    Constitutional: NAD, awake and alert, well developed  EYES: EOMI, conjunctiva clear  ENT:  Normal Hearing, no tonsillar exudates   Neck: Soft and supple , no thyromegaly   Respiratory: Breath sounds are clear bilaterally, No wheezing, rales or rhonchi, no tachypnea, no accessory muscle use  Cardiovascular: S1 and S2, regular rate and rhythm, no Murmurs, gallops or rubs, no JVD, no leg edema  Gastrointestinal: Bowel Sounds present, soft, nontender, nondistended, no guarding, no rebound  Extremities: No cyanosis or clubbing; warm to touch  Vascular: 2+ peripheral pulses lower ex  Neurological: No focal deficits, CN II-XII intact bilaterally, sensation to light touch intact in all extremities  Musculoskeletal: 5/5 strength b/l upper and lower extremities; no joint swelling.  Skin: No rashes, no ulcerations

## 2024-01-16 NOTE — ED ADULT NURSE NOTE - OBJECTIVE STATEMENT
A&Ox4. ambulatory w/cane. c/o bilateral leg pain. PT states she had chest pain earlier today and was found to be in SVT at the DRs office. NAD. pt denies SOB, chest pain, dizziness, weakness, urinary symptoms, HA, n/v/d, fevers, chills. respirations are even and un labored. skin intact. braces observed to bilateral legs. skin intact. IV placed to left arm by EMS prior to arrival.

## 2024-01-16 NOTE — ED PROVIDER NOTE - CLINICAL SUMMARY MEDICAL DECISION MAKING FREE TEXT BOX
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  Patient reports at 1030 this morning, while seated at work, she developed heart racing palpitations associated with lightheadedness.  Patient reports she was then evaluated in urgent care where she was found to have SVT.  Patient received adenosine from EMS at 3:50 PM with which patient reports she has had resolution of symptoms ever since.  Patient reports taking over-the-counter nasal decongestions which contain dextromethorphan and phenylephrine this morning.  She denies any fevers, chills, chest pain, shortness of breath, syncope, illicit drug use, alcohol abuse, recent surgeries, lower extremity swelling, exogenous hormone use, history of malignancy.  This is a patient with SVT status post adenosine.  Plan to order labs, troponin, TSH, chest x-ray.  Disposition pending workup.

## 2024-01-16 NOTE — H&P ADULT - ASSESSMENT
63F spinal tumor excision with spasticity on baclofen presenting with palpitations x1 day. Found to have SVT (1st episode) resolved s/p adenosine

## 2024-01-16 NOTE — ED PROVIDER NOTE - ATTENDING APP SHARED VISIT CONTRIBUTION OF CARE
Attending note:   After face to face evaluation of this patient, I concur with above noted hx, pe, and care plan for this patient.  Tolliver: 63-year-old female with history of spinal tumor excision with spasticity in her lower extremities for which she takes baclofen every day.  Patient presents the ED today with palpitations.  Patient was sitting at her computer at 1030 this morning when she developed palpitations with lightheadedness and weakness.  Patient denies any chest pain or shortness of breath with it.  Patient was evaluated in urgent care and diagnosed with SVT and EMS was called.  Patient symptoms resolved almost immediately after getting adenosine 6 mg.  And a rhythm strip demonstrates this.  Of note patient did have a cup of coffee this morning and took phenylephrine and DayQuil as well.  No other supplements taken.  Patient currently is pain-free and symptom-free.  Patient is very anxious about this happening again.  Patient denies any travel, recent illness.  Patient took DayQuil for head congestion.  Currently patient is vital signs show normal blood pressure and heart rate.  Patient is afebrile and well-appearing.  Lungs are clear and heart is regular rate and rhythm.  There is no pitting edema and no JVD.  Is noted.  Abdomen is soft nontender.  Although phenylephrine is possible reason for patient's SVT if this is a very small dose.  Will also check electrolytes including potassium, magnesium and TSH and troponin.  Will keep patient on cardiac monitor.  Would consider telemetry monitoring overnight in CDU for telemetry doc today evaluation tomorrow.

## 2024-01-16 NOTE — H&P ADULT - NSHPREVIEWOFSYSTEMS_GEN_ALL_CORE
ROS:    Constitutional: [ ] fevers [ ] chills   HEENT: [ ] postnasal drip [ ] nasal congestion  CV: [x ] chest pain [ ] orthopnea [x ] palpitations [ ] murmur  Resp: [ ] cough  [ ] dyspnea [ ] wheezing  GI: [ ] nausea [ ] vomiting [ ] diarrhea [ ] constipation [ ] abd pain  : [ ] dysuria  [ ] increased urinary frequency  Musculoskeletal: [ ] back pain [ ] myalgias [ ] arthralgias  Skin: [ ] rash [ ] itch  Neurological: [ ] headache [ ] dizziness [ ] syncope   Endocrine: [ ] diabetes [ ] thyroid problem  Hematologic/Lymphatic: [ ] anemia [ ] bleeding problem  [x ] All other systems negative

## 2024-01-16 NOTE — H&P ADULT - NSICDXPASTMEDICALHX_GEN_ALL_CORE_FT
PAST MEDICAL HISTORY:  Carpal tunnel syndrome on both sides     Kidney stone     Myelopathy of thoracic region 1972    Paralysis from waist down due to spinal surgery

## 2024-01-16 NOTE — H&P ADULT - PROBLEM SELECTOR PLAN 2
Takes baclofen 20mg @ 8AM, 40mg @ noon and 20mg at 8PM  -will give baclofen 20mg 8AM, 20mg 12PM, 20mg 4PM and 20mg 8PM to avoid oversedation. patient denies hx oversedation

## 2024-01-16 NOTE — H&P ADULT - NSICDXPASTSURGICALHX_GEN_ALL_CORE_FT
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

## 2024-01-16 NOTE — H&P ADULT - NSHPLABSRESULTS_GEN_ALL_CORE
Personally reviewed labs:                        11.5   8.98  )-----------( 251      ( 16 Jan 2024 18:29 )             37.1     01-16-24 @ 18:29    142  |  107  |  18             --------------------------< 92     3.9  |  27  | 0.86    eGFR AA: --  eGFR N-AA: --    Calcium: 8.5  Phosphorus: --  Magnesium: 1.80    AST: 17    ALT: 12  AlkPhos: 100  Protein: 6.2  Albumin: 3.4  TBili: <0.2  D-Bili: --    Troponin 108--118bpm      RADIOLOGY & ADDITIONAL TESTS:    EKG my independent interpretation:  Initial EKG: SVT @ 188bpm  Repeat here @ 1627 NSR 91bpm, no STD or HUSSEIN  @ 2132 NSR @ 72bpm, no STD or HUSSEIN    CXR my independent interpretation: clear lungs

## 2024-01-16 NOTE — ED ADULT TRIAGE NOTE - CHIEF COMPLAINT QUOTE
Pt went to MD office today related to chest pain starting at 1030 this morning, pt found to be in SVT, given 6mg of adenosine with EMS and regulated heart rhythm. Pt currently in NSR, repeat ekg to be obtained. Pt denies shortness of breath, no headache/dizziness, afebrile.

## 2024-01-16 NOTE — ED PROVIDER NOTE - OBJECTIVE STATEMENT
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  Patient reports at 1030 this morning, while seated at work, she developed heart racing palpitations associated with lightheadedness.  Patient reports she was then evaluated in urgent care where she was found to have SVT.  Patient received adenosine from EMS at 3:50 PM with which patient reports she has had resolution of symptoms ever since.  Patient reports taking over-the-counter nasal decongestions which contain dextromethorphan and phenylephrine this morning.  She denies any fevers, chills, chest pain, shortness of breath, syncope, illicit drug use, alcohol abuse, recent surgeries, lower extremity swelling, exogenous hormone use, history of malignancy.

## 2024-01-16 NOTE — ED PROVIDER NOTE - PROGRESS NOTE DETAILS
FAITH Ayala: received pt in sign out, pt is a 62 YO F with PMH Spinal cord tumor removal who presented to ED with palpitations, dizziness. Pt was found to be in SVT by EMS, which converted to NSR after adenosine given. Pt troponin 108-->118, discussed with tele doc, recommending admission to cardiology service, pt aware and in agreement with plan.

## 2024-01-16 NOTE — H&P ADULT - NSICDXFAMILYHX_GEN_ALL_CORE_FT
FAMILY HISTORY:  Father  Still living? Yes, Estimated age: Age Unknown  Family history of heart attack, Age at diagnosis: Age Unknown    Mother  Still living? No  Family history of pulmonary fibrosis, Age at diagnosis: Age Unknown

## 2024-01-16 NOTE — H&P ADULT - HISTORY OF PRESENT ILLNESS
63F spinal tumor excision with spasticity on baclofen presenting with palpitations x1 day. The patient denies prior hx palpitations or cardiac disease. Around 1030AM today she was sitting down when she suddenly felt heart racing. She also had very mild chest pressure. She had some LH and HA with this as well but denied SOB. Her smartwatch indicated a bpm of 204 after previously being 69bpm. It dipped down to 180s and went up back to 200s. This persisted for several hours. She went to [facility] and was told of SVT. She was reportedly given adenosine 6mg at 350pm which returned her to NSR    Tele strip reviewed with EMS: 350PM - SVT 178bpm --> NSR @86bpm

## 2024-01-17 DIAGNOSIS — I47.10 SUPRAVENTRICULAR TACHYCARDIA, UNSPECIFIED: ICD-10-CM

## 2024-01-17 DIAGNOSIS — M62.838 OTHER MUSCLE SPASM: ICD-10-CM

## 2024-01-17 DIAGNOSIS — Z29.9 ENCOUNTER FOR PROPHYLACTIC MEASURES, UNSPECIFIED: ICD-10-CM

## 2024-01-17 LAB
ALBUMIN SERPL ELPH-MCNC: 3.3 G/DL — SIGNIFICANT CHANGE UP (ref 3.3–5)
ALP SERPL-CCNC: 92 U/L — SIGNIFICANT CHANGE UP (ref 40–120)
ALT FLD-CCNC: 12 U/L — SIGNIFICANT CHANGE UP (ref 4–33)
ANION GAP SERPL CALC-SCNC: 10 MMOL/L — SIGNIFICANT CHANGE UP (ref 7–14)
AST SERPL-CCNC: 20 U/L — SIGNIFICANT CHANGE UP (ref 4–32)
BILIRUB SERPL-MCNC: 0.3 MG/DL — SIGNIFICANT CHANGE UP (ref 0.2–1.2)
BUN SERPL-MCNC: 17 MG/DL — SIGNIFICANT CHANGE UP (ref 7–23)
CALCIUM SERPL-MCNC: 8.2 MG/DL — LOW (ref 8.4–10.5)
CHLORIDE SERPL-SCNC: 107 MMOL/L — SIGNIFICANT CHANGE UP (ref 98–107)
CK MB BLD-MCNC: 4.4 % — HIGH (ref 0–2.5)
CK MB BLD-MCNC: 5.2 % — HIGH (ref 0–2.5)
CK MB CFR SERPL CALC: 4.6 NG/ML — SIGNIFICANT CHANGE UP
CK MB CFR SERPL CALC: 4.9 NG/ML — HIGH
CK SERPL-CCNC: 105 U/L — SIGNIFICANT CHANGE UP (ref 25–170)
CK SERPL-CCNC: 95 U/L — SIGNIFICANT CHANGE UP (ref 25–170)
CO2 SERPL-SCNC: 22 MMOL/L — SIGNIFICANT CHANGE UP (ref 22–31)
CREAT SERPL-MCNC: 0.81 MG/DL — SIGNIFICANT CHANGE UP (ref 0.5–1.3)
EGFR: 83 ML/MIN/1.73M2 — SIGNIFICANT CHANGE UP
GLUCOSE SERPL-MCNC: 95 MG/DL — SIGNIFICANT CHANGE UP (ref 70–99)
HCT VFR BLD CALC: 34.8 % — SIGNIFICANT CHANGE UP (ref 34.5–45)
HCV AB S/CO SERPL IA: 0.27 S/CO — SIGNIFICANT CHANGE UP (ref 0–0.99)
HCV AB SERPL-IMP: SIGNIFICANT CHANGE UP
HGB BLD-MCNC: 11.2 G/DL — LOW (ref 11.5–15.5)
MAGNESIUM SERPL-MCNC: 1.9 MG/DL — SIGNIFICANT CHANGE UP (ref 1.6–2.6)
MCHC RBC-ENTMCNC: 25.7 PG — LOW (ref 27–34)
MCHC RBC-ENTMCNC: 32.2 GM/DL — SIGNIFICANT CHANGE UP (ref 32–36)
MCV RBC AUTO: 80 FL — SIGNIFICANT CHANGE UP (ref 80–100)
NRBC # BLD: 0 /100 WBCS — SIGNIFICANT CHANGE UP (ref 0–0)
NRBC # FLD: 0 K/UL — SIGNIFICANT CHANGE UP (ref 0–0)
PHOSPHATE SERPL-MCNC: 2.8 MG/DL — SIGNIFICANT CHANGE UP (ref 2.5–4.5)
PLATELET # BLD AUTO: 227 K/UL — SIGNIFICANT CHANGE UP (ref 150–400)
POTASSIUM SERPL-MCNC: 4 MMOL/L — SIGNIFICANT CHANGE UP (ref 3.5–5.3)
POTASSIUM SERPL-SCNC: 4 MMOL/L — SIGNIFICANT CHANGE UP (ref 3.5–5.3)
PROT SERPL-MCNC: 6.2 G/DL — SIGNIFICANT CHANGE UP (ref 6–8.3)
RBC # BLD: 4.35 M/UL — SIGNIFICANT CHANGE UP (ref 3.8–5.2)
RBC # FLD: 13.3 % — SIGNIFICANT CHANGE UP (ref 10.3–14.5)
SODIUM SERPL-SCNC: 139 MMOL/L — SIGNIFICANT CHANGE UP (ref 135–145)
TROPONIN T, HIGH SENSITIVITY RESULT: 109 NG/L — CRITICAL HIGH
TROPONIN T, HIGH SENSITIVITY RESULT: 76 NG/L — CRITICAL HIGH
WBC # BLD: 7.66 K/UL — SIGNIFICANT CHANGE UP (ref 3.8–10.5)
WBC # FLD AUTO: 7.66 K/UL — SIGNIFICANT CHANGE UP (ref 3.8–10.5)

## 2024-01-17 RX ADMIN — Medication 20 MILLIGRAM(S): at 14:55

## 2024-01-17 RX ADMIN — Medication 100 GRAM(S): at 02:21

## 2024-01-17 RX ADMIN — Medication 20 MILLIGRAM(S): at 10:30

## 2024-01-17 RX ADMIN — Medication 25 MILLIGRAM(S): at 02:20

## 2024-01-17 RX ADMIN — Medication 20 MILLIGRAM(S): at 21:45

## 2024-01-17 RX ADMIN — Medication 650 MILLIGRAM(S): at 10:30

## 2024-01-17 RX ADMIN — ENOXAPARIN SODIUM 40 MILLIGRAM(S): 100 INJECTION SUBCUTANEOUS at 14:55

## 2024-01-17 NOTE — CONSULT NOTE ADULT - ASSESSMENT
EKG SVT likelt AVNRT     Assessment and Plan     1) SVT : resolved with adenosine   - will get EP input   - monitor on tele   - echo pending   - c/w metoprolol     2) HTN c/w metoprolol        3) DVT PPX   Lovenox

## 2024-01-17 NOTE — PROGRESS NOTE ADULT - SUBJECTIVE AND OBJECTIVE BOX
Name of Patient : BRIDGER REYNAGA  MRN: [id number]  Date of visit: 01-17-24       Subjective: Patient seen and examined. No new events except as noted.     REVIEW OF SYSTEMS:    CONSTITUTIONAL: No weakness, fevers or chills  EYES/ENT: No visual changes;  No vertigo or throat pain   NECK: No pain or stiffness  RESPIRATORY: No cough, wheezing, hemoptysis; No shortness of breath  CARDIOVASCULAR: No chest pain or palpitations  GASTROINTESTINAL: No abdominal or epigastric pain. No nausea, vomiting, or hematemesis; No diarrhea or constipation. No melena or hematochezia.  GENITOURINARY: No dysuria, frequency or hematuria  NEUROLOGICAL: No numbness or weakness  SKIN: No itching, burning, rashes, or lesions   All other review of systems is negative unless indicated above.    MEDICATIONS:  MEDICATIONS  (STANDING):  baclofen 20 milliGRAM(s) Oral <User Schedule>  enoxaparin Injectable 40 milliGRAM(s) SubCutaneous every 24 hours  metoprolol succinate ER 25 milliGRAM(s) Oral daily      PHYSICAL EXAM:  T(C): 36 (01-17-24 @ 08:45), Max: 36.9 (01-17-24 @ 00:37)  HR: 63 (01-17-24 @ 12:43) (63 - 80)  BP: 96/62 (01-17-24 @ 12:43) (96/62 - 120/64)  RR: 15 (01-17-24 @ 12:43) (15 - 18)  SpO2: 98% (01-17-24 @ 12:43) (97% - 100%)  Wt(kg): --  I&O's Summary        Appearance: Normal	  HEENT:  PERRLA   Lymphatic: No lymphadenopathy   Cardiovascular: Normal S1 S2, no JVD  Respiratory: normal effort , clear  Gastrointestinal:  Soft, Non-tender  Skin: No rashes,  warm to touch  Psychiatry:  Mood & affect appropriate  Musculuskeletal: No edema    recent labs, Imaging and EKGs personally reviewed                           11.2   7.66  )-----------( 227      ( 17 Jan 2024 00:09 )             34.8               01-17    139  |  107  |  17  ----------------------------<  95  4.0   |  22  |  0.81    Ca    8.2<L>      17 Jan 2024 00:09  Phos  2.8     01-17  Mg     1.90     01-17    TPro  6.2  /  Alb  3.3  /  TBili  0.3  /  DBili  x   /  AST  20  /  ALT  12  /  AlkPhos  92  01-17    PT/INR - ( 16 Jan 2024 18:29 )   PT: 11.5 sec;   INR: 1.03 ratio         PTT - ( 16 Jan 2024 18:29 )  PTT:28.7 sec       CARDIAC MARKERS ( 17 Jan 2024 07:06 )  x     / x     / 105 U/L / x     / 4.6 ng/mL  CARDIAC MARKERS ( 17 Jan 2024 00:09 )  x     / x     / 95 U/L / x     / 4.9 ng/mL              Urinalysis Basic - ( 17 Jan 2024 00:09 )    Color: x / Appearance: x / SG: x / pH: x  Gluc: 95 mg/dL / Ketone: x  / Bili: x / Urobili: x   Blood: x / Protein: x / Nitrite: x   Leuk Esterase: x / RBC: x / WBC x   Sq Epi: x / Non Sq Epi: x / Bacteria: x

## 2024-01-18 LAB
ANION GAP SERPL CALC-SCNC: 9 MMOL/L — SIGNIFICANT CHANGE UP (ref 7–14)
BUN SERPL-MCNC: 18 MG/DL — SIGNIFICANT CHANGE UP (ref 7–23)
CALCIUM SERPL-MCNC: 7.8 MG/DL — LOW (ref 8.4–10.5)
CHLORIDE SERPL-SCNC: 108 MMOL/L — HIGH (ref 98–107)
CO2 SERPL-SCNC: 25 MMOL/L — SIGNIFICANT CHANGE UP (ref 22–31)
CREAT SERPL-MCNC: 0.75 MG/DL — SIGNIFICANT CHANGE UP (ref 0.5–1.3)
EGFR: 91 ML/MIN/1.73M2 — SIGNIFICANT CHANGE UP
GLUCOSE SERPL-MCNC: 89 MG/DL — SIGNIFICANT CHANGE UP (ref 70–99)
HCT VFR BLD CALC: 35.1 % — SIGNIFICANT CHANGE UP (ref 34.5–45)
HGB BLD-MCNC: 11.2 G/DL — LOW (ref 11.5–15.5)
MAGNESIUM SERPL-MCNC: 1.8 MG/DL — SIGNIFICANT CHANGE UP (ref 1.6–2.6)
MCHC RBC-ENTMCNC: 25.9 PG — LOW (ref 27–34)
MCHC RBC-ENTMCNC: 31.9 GM/DL — LOW (ref 32–36)
MCV RBC AUTO: 81.1 FL — SIGNIFICANT CHANGE UP (ref 80–100)
NRBC # BLD: 0 /100 WBCS — SIGNIFICANT CHANGE UP (ref 0–0)
NRBC # FLD: 0 K/UL — SIGNIFICANT CHANGE UP (ref 0–0)
PHOSPHATE SERPL-MCNC: 2.8 MG/DL — SIGNIFICANT CHANGE UP (ref 2.5–4.5)
PLATELET # BLD AUTO: 210 K/UL — SIGNIFICANT CHANGE UP (ref 150–400)
POTASSIUM SERPL-MCNC: 4 MMOL/L — SIGNIFICANT CHANGE UP (ref 3.5–5.3)
POTASSIUM SERPL-SCNC: 4 MMOL/L — SIGNIFICANT CHANGE UP (ref 3.5–5.3)
RBC # BLD: 4.33 M/UL — SIGNIFICANT CHANGE UP (ref 3.8–5.2)
RBC # FLD: 13.4 % — SIGNIFICANT CHANGE UP (ref 10.3–14.5)
SODIUM SERPL-SCNC: 142 MMOL/L — SIGNIFICANT CHANGE UP (ref 135–145)
WBC # BLD: 7.46 K/UL — SIGNIFICANT CHANGE UP (ref 3.8–10.5)
WBC # FLD AUTO: 7.46 K/UL — SIGNIFICANT CHANGE UP (ref 3.8–10.5)

## 2024-01-18 PROCEDURE — 93306 TTE W/DOPPLER COMPLETE: CPT | Mod: 26

## 2024-01-18 RX ADMIN — Medication 20 MILLIGRAM(S): at 17:50

## 2024-01-18 RX ADMIN — Medication 650 MILLIGRAM(S): at 22:58

## 2024-01-18 RX ADMIN — Medication 20 MILLIGRAM(S): at 11:59

## 2024-01-18 RX ADMIN — Medication 650 MILLIGRAM(S): at 01:00

## 2024-01-18 RX ADMIN — Medication 25 MILLIGRAM(S): at 09:22

## 2024-01-18 RX ADMIN — Medication 20 MILLIGRAM(S): at 22:58

## 2024-01-18 RX ADMIN — Medication 650 MILLIGRAM(S): at 00:43

## 2024-01-18 RX ADMIN — Medication 20 MILLIGRAM(S): at 09:44

## 2024-01-18 RX ADMIN — ENOXAPARIN SODIUM 40 MILLIGRAM(S): 100 INJECTION SUBCUTANEOUS at 15:01

## 2024-01-18 NOTE — PROGRESS NOTE ADULT - PROBLEM SELECTOR PLAN 1
-200s per patient. EKG in chart from [facility] SVT to 188bpm. S/p adenosine and now NSR  -tele  -echo  -EP eval per card team   -troponin 108--118 likely in setting of demand ischemia. She does not have CP, LH, palpitations at this time. She is asymptomatic.  -add on CKMB  -repeat troponin/CKMB  -start metoprolol 25mg qd with hold parameters. Patient notes chronically low BP 90/60s at baseline  - ischemic NELSON per card   -keep K>4 and Mg>2
-200s per patient. EKG in chart from [facility] SVT to 188bpm. S/p adenosine and now NSR  -tele  -echo  -EP eval per card team   -troponin 108--118 likely in setting of demand ischemia. She does not have CP, LH, palpitations at this time. She is asymptomatic.  -repeat troponin/CKMB  -start metoprolol 25mg qd with hold parameters. Patient notes chronically low BP 90/60s at baseline  - ischemic NELSON per card   -keep K>4 and Mg>2

## 2024-01-18 NOTE — PROGRESS NOTE ADULT - PROBLEM SELECTOR PLAN 2
Takes baclofen 20mg @ 8AM, 40mg @ noon and 20mg at 8PM  -pain control as ordered      Monitr hgb level
Takes baclofen 20mg @ 8AM, 40mg @ noon and 20mg at 8PM  -pain control as ordered      Monitr hgb level

## 2024-01-18 NOTE — PROGRESS NOTE ADULT - ASSESSMENT
EKG SVT likelt AVNRT     Assessment and Plan     1) SVT : resolved with adenosine   - will get EP input   - monitor on tele   - echo pending   - c/w metoprolol     2) HTN c/w metoprolol        3) NSTEMI  - Denies CP , likely 2/2 demand   - get echo     DVT PPX   Lovenox

## 2024-01-18 NOTE — ED ADULT NURSE REASSESSMENT NOTE - NS ED NURSE REASSESS COMMENT FT1
Break RN: Pt laying in bed, NAD, respirations even and unlabored, NSR on the monitor. VS in flow sheet. Pt denies shortness of breath, chest pain, N/V/D, headache, dizziness, weakness, fever, chills,  symptoms. Pt medicated per MD orders. Bed in lowest position, safety maintained.
Break coverage RN: Pt is A&Ox4, breathing even and unlabored. NSR on the cardiac monitor. Denies any pain or discomfort at this time. Pt and bed changed, comfort measures provided. Waiting for a bed assignment
Pt received in bed. Pt alert and oriented x4. Pt admitted to tele. Awaiting bed assignment. Pt has DASH diet ordered. Pt provided with meal tray. Tolerated well. Pt denies pain, no distress noted. Vitals stable. Support ongoing. Penny
Report received from LANDON Yepez. Pt received at 22:30. Respirations even and unlabored on room air. Pt remains on continuous cardiac monitor, NSR noted. Pt remains on purewick at this time, 300 mL of dark yellow output noted. No acute distress noted. Pt denies headache, dizziness, chest pain, SOB, nausea, vomiting and diarrhea. Plan of care ongoing, comfort measures provided and safety measures maintained. Awaiting bed assignment.
Report received from day shift RN Kd. pt is AAOX4. pt denies chest pain, SOB at this time. pt VS as documented. pt RR are even and unlabored. pt offers no complaints at this time. Safety maintained. Plan of care ongoing.
Upon reassessment brief and linens changed at this time. Sacrum skin clean, dry and intact. Pt repositioned and boosted in stretcher at this time. Respirations even and unlabored on room air. Pt remains on continuous cardiac monitor, NSR noted. pt denies any headache, chest pain, SOB, or dizziness. Plan of care ongoing, comfort measures provided and safety measures maintained. Awaiting bed assignment.
Upon reassessment pt A&Ox3 and sleeping in stretcher at this time. Arousable to verbal and tactile stimuli. Respirations even and unlabored on room air. Pt remains on continuous cardiac monitor, NSR noted. Pt denies headache, dizziness, chest pain and SOB at this time. No acute distress noted. Plan of care ongoing, comfort measures provided and safety measures maintained. Awaiting bed assignment.
patient AOX4 came in c/o SVT. denies any pain. ON CM shows NSR. skin warm and d ry. breathing even and unlabored. patient uses crutches to ambulate at baseline. h/o paralysis from waist down. NAD. admitted to Tele. awaiting on bed.
Pt received in on acute distress, A&Ox4, resting calmly and comfortably, on continuous cardiac monitor with sinus rhythm. Pt denies chest pain, palpitation, and sob. Pt pending bed assignment and TTE. Will continue to monitor.

## 2024-01-18 NOTE — PROGRESS NOTE ADULT - SUBJECTIVE AND OBJECTIVE BOX
Pancho Angela MD  Interventional Cardiology / Endovascular Specialist  [place] Office : [phone] [place] [address] [place] [address]  Tel:   [place] Office : [address] [facility] [place] [address]  Tel: [phone]    pt lying in bed in NAD   	  MEDICATIONS:  enoxaparin Injectable 40 milliGRAM(s) SubCutaneous every 24 hours  metoprolol succinate ER 25 milliGRAM(s) Oral daily        acetaminophen     Tablet .. 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      S/P tonsillectomy      S/P ORIF (open reduction internal fixation) fracture  left tibia/fibula fx      S/P arthroscopy of left knee            SOCIAL HISTORY: Substance Use (street drugs): ( x ) never used  (  ) other:    FAMILY HISTORY:  Family history of pulmonary fibrosis (Mother)    Family history of heart attack (Father)        REVIEW OF SYSTEMS:  CONSTITUTIONAL: No fever, weight loss, or fatigue  EYES: No eye pain, visual disturbances, or discharge  ENMT:  No difficulty hearing, tinnitus, vertigo; No sinus or throat pain  BREASTS: No pain, masses, or nipple discharge  GASTROINTESTINAL: No abdominal or epigastric pain. No nausea, vomiting, or hematemesis; No diarrhea or constipation. No melena or hematochezia.  GENITOURINARY: No dysuria, frequency, hematuria, or incontinence  NEUROLOGICAL: No headaches, memory loss, loss of strength, numbness, or tremors  ENDOCRINE: No heat or cold intolerance; No hair loss  MUSCULOSKELETAL: No joint pain or swelling; No muscle, back, or extremity pain  PSYCHIATRIC: No depression, anxiety, mood swings, or difficulty sleeping  HEME/LYMPH: No easy bruising, or bleeding gums  All others negative    PHYSICAL EXAM:  T(C): 36.8 (24 @ 20:41), Max: 37 (24 @ 00:00)  HR: 89 (24 @ 20:41) (65 - 89)  BP: 103/78 (24 @ 20:41) (103/78 - 124/74)  RR: 20 (24 @ 20:41) (14 - 20)  SpO2: 98% (24 @ 20:41) (97% - 100%)  Wt(kg): --  I&O's Summary    GENERAL: NAD  EYES:  conjunctiva and sclera clear  ENMT: No tonsillar erythema, exudates, or enlargement  Cardiovascular: Normal S1 S2, No JVD, No murmurs, No edema  Respiratory: Lungs clear to auscultation	  Gastrointestinal:  Soft, Non-tender, + BS	  Extremities: No edema                            11.2   7.46  )-----------( 210      ( 2024 05:43 )             35.1         142  |  108<H>  |  18  ----------------------------<  89  4.0   |  25  |  0.75    Ca    7.8<L>      2024 05:43  Phos  2.8       Mg     1.80         TPro  6.2  /  Alb  3.3  /  TBili  0.3  /  DBili  x   /  AST  20  /  ALT  12  /  AlkPhos  92      proBNP:   Lipid Profile:   HgA1c:   TSH:     Consultant(s) Notes Reviewed:  [x ] YES  [ ] NO    Care Discussed with Consultants/Other Providers [ x] YES  [ ] NO    Imaging Personally Reviewed independently:  [x] YES  [ ] NO    All labs, radiologic studies, vitals, orders and medications list reviewed. Patient is seen and examined at bedside. Case discussed with medical team.

## 2024-01-18 NOTE — PROGRESS NOTE ADULT - SUBJECTIVE AND OBJECTIVE BOX
Name of Patient : BRIDGER REYNAGA  MRN: [id number]  Date of visit: 01-18-24       Subjective: Patient seen and examined. No new events except as noted.   Doing okay   no chest pain, SOB     REVIEW OF SYSTEMS:    CONSTITUTIONAL: No weakness, fevers or chills  EYES/ENT: No visual changes;  No vertigo or throat pain   NECK: No pain or stiffness  RESPIRATORY: No cough, wheezing, hemoptysis; No shortness of breath  CARDIOVASCULAR: No chest pain or palpitations  GASTROINTESTINAL: No abdominal or epigastric pain. No nausea, vomiting, or hematemesis; No diarrhea or constipation. No melena or hematochezia.  GENITOURINARY: No dysuria, frequency or hematuria  NEUROLOGICAL: No numbness or weakness  SKIN: No itching, burning, rashes, or lesions   All other review of systems is negative unless indicated above.    MEDICATIONS:  MEDICATIONS  (STANDING):  baclofen 20 milliGRAM(s) Oral <User Schedule>  enoxaparin Injectable 40 milliGRAM(s) SubCutaneous every 24 hours  metoprolol succinate ER 25 milliGRAM(s) Oral daily      PHYSICAL EXAM:  T(C): 36.8 (01-18-24 @ 12:48), Max: 37.1 (01-17-24 @ 20:07)  HR: 65 (01-18-24 @ 12:48) (65 - 73)  BP: 123/56 (01-18-24 @ 12:48) (111/66 - 124/74)  RR: 20 (01-18-24 @ 12:48) (14 - 21)  SpO2: 100% (01-18-24 @ 12:48) (97% - 100%)  Wt(kg): --  I&O's Summary        Appearance: Normal	  HEENT:  PERRLA   Lymphatic: No lymphadenopathy   Cardiovascular: Normal S1 S2, no JVD  Respiratory: normal effort , clear  Gastrointestinal:  Soft, Non-tender  Skin: No rashes,  warm to touch  Psychiatry:  Mood & affect appropriate  Musculuskeletal: No edema    recent labs, Imaging and EKGs personally reviewed                           11.2   7.46  )-----------( 210      ( 18 Jan 2024 05:43 )             35.1               01-18    142  |  108<H>  |  18  ----------------------------<  89  4.0   |  25  |  0.75    Ca    7.8<L>      18 Jan 2024 05:43  Phos  2.8     01-18  Mg     1.80     01-18    TPro  6.2  /  Alb  3.3  /  TBili  0.3  /  DBili  x   /  AST  20  /  ALT  12  /  AlkPhos  92  01-17    PT/INR - ( 16 Jan 2024 18:29 )   PT: 11.5 sec;   INR: 1.03 ratio         PTT - ( 16 Jan 2024 18:29 )  PTT:28.7 sec       CARDIAC MARKERS ( 17 Jan 2024 07:06 )  x     / x     / 105 U/L / x     / 4.6 ng/mL  CARDIAC MARKERS ( 17 Jan 2024 00:09 )  x     / x     / 95 U/L / x     / 4.9 ng/mL              Urinalysis Basic - ( 18 Jan 2024 05:43 )    Color: x / Appearance: x / SG: x / pH: x  Gluc: 89 mg/dL / Ketone: x  / Bili: x / Urobili: x   Blood: x / Protein: x / Nitrite: x   Leuk Esterase: x / RBC: x / WBC x   Sq Epi: x / Non Sq Epi: x / Bacteria: x

## 2024-01-18 NOTE — ED ADULT NURSE REASSESSMENT NOTE - NSFALLRISKINTERV_ED_ALL_ED

## 2024-01-19 LAB
ALBUMIN SERPL ELPH-MCNC: 3.2 G/DL — LOW (ref 3.3–5)
ALP SERPL-CCNC: 90 U/L — SIGNIFICANT CHANGE UP (ref 40–120)
ALT FLD-CCNC: 12 U/L — SIGNIFICANT CHANGE UP (ref 4–33)
ANION GAP SERPL CALC-SCNC: 9 MMOL/L — SIGNIFICANT CHANGE UP (ref 7–14)
AST SERPL-CCNC: 15 U/L — SIGNIFICANT CHANGE UP (ref 4–32)
BILIRUB SERPL-MCNC: 0.3 MG/DL — SIGNIFICANT CHANGE UP (ref 0.2–1.2)
BUN SERPL-MCNC: 15 MG/DL — SIGNIFICANT CHANGE UP (ref 7–23)
CALCIUM SERPL-MCNC: 8.4 MG/DL — SIGNIFICANT CHANGE UP (ref 8.4–10.5)
CHLORIDE SERPL-SCNC: 107 MMOL/L — SIGNIFICANT CHANGE UP (ref 98–107)
CO2 SERPL-SCNC: 25 MMOL/L — SIGNIFICANT CHANGE UP (ref 22–31)
CREAT SERPL-MCNC: 0.77 MG/DL — SIGNIFICANT CHANGE UP (ref 0.5–1.3)
EGFR: 88 ML/MIN/1.73M2 — SIGNIFICANT CHANGE UP
GLUCOSE SERPL-MCNC: 86 MG/DL — SIGNIFICANT CHANGE UP (ref 70–99)
HCT VFR BLD CALC: 36.4 % — SIGNIFICANT CHANGE UP (ref 34.5–45)
HGB BLD-MCNC: 11.4 G/DL — LOW (ref 11.5–15.5)
MAGNESIUM SERPL-MCNC: 1.8 MG/DL — SIGNIFICANT CHANGE UP (ref 1.6–2.6)
MCHC RBC-ENTMCNC: 25.3 PG — LOW (ref 27–34)
MCHC RBC-ENTMCNC: 31.3 GM/DL — LOW (ref 32–36)
MCV RBC AUTO: 80.7 FL — SIGNIFICANT CHANGE UP (ref 80–100)
NRBC # BLD: 0 /100 WBCS — SIGNIFICANT CHANGE UP (ref 0–0)
NRBC # FLD: 0 K/UL — SIGNIFICANT CHANGE UP (ref 0–0)
PLATELET # BLD AUTO: 233 K/UL — SIGNIFICANT CHANGE UP (ref 150–400)
POTASSIUM SERPL-MCNC: 4 MMOL/L — SIGNIFICANT CHANGE UP (ref 3.5–5.3)
POTASSIUM SERPL-SCNC: 4 MMOL/L — SIGNIFICANT CHANGE UP (ref 3.5–5.3)
PROT SERPL-MCNC: 5.9 G/DL — LOW (ref 6–8.3)
RBC # BLD: 4.51 M/UL — SIGNIFICANT CHANGE UP (ref 3.8–5.2)
RBC # FLD: 13.6 % — SIGNIFICANT CHANGE UP (ref 10.3–14.5)
SODIUM SERPL-SCNC: 141 MMOL/L — SIGNIFICANT CHANGE UP (ref 135–145)
WBC # BLD: 6.82 K/UL — SIGNIFICANT CHANGE UP (ref 3.8–10.5)
WBC # FLD AUTO: 6.82 K/UL — SIGNIFICANT CHANGE UP (ref 3.8–10.5)

## 2024-01-19 PROCEDURE — 99222 1ST HOSP IP/OBS MODERATE 55: CPT | Mod: GC

## 2024-01-19 RX ADMIN — Medication 25 MILLIGRAM(S): at 06:24

## 2024-01-19 RX ADMIN — Medication 20 MILLIGRAM(S): at 13:27

## 2024-01-19 RX ADMIN — ENOXAPARIN SODIUM 40 MILLIGRAM(S): 100 INJECTION SUBCUTANEOUS at 15:55

## 2024-01-19 RX ADMIN — Medication 20 MILLIGRAM(S): at 08:53

## 2024-01-19 RX ADMIN — Medication 20 MILLIGRAM(S): at 18:12

## 2024-01-19 RX ADMIN — Medication 650 MILLIGRAM(S): at 00:21

## 2024-01-19 RX ADMIN — Medication 20 MILLIGRAM(S): at 21:27

## 2024-01-19 NOTE — PROGRESS NOTE ADULT - ASSESSMENT
63F spinal tumor excision with spasticity on baclofen presenting with palpitations x1 day. Found to have SVT (1st episode) resolved s/p adenosine      # SVT (supraventricular tachycardia) / palpitation   improved   seen by EP and cardio   started on lopressor     Chest pain :   now resolved   2/2 SVT   likely demand ischemia   -echo / NST   seen by cardio     #Muscle spasticity.   ·  Plan: Takes baclofen 20mg @ 8AM, 40mg @ noon and 20mg at 8PM  -pain control as ordered    dispo: plan for NST , if pt. is willing , EP recommend ablation early next week , but if HR is controlled and cardio clears , can get then as out pt

## 2024-01-19 NOTE — CONSULT NOTE ADULT - SUBJECTIVE AND OBJECTIVE BOX
Pancho Angela MD  Interventional Cardiology / Endovascular Specialist  [place] Office : [address] [place] [address] [place] [address]  Tel:   [place] Office : [address] [facility] [place] [address]  Tel: [phone]        HISTORY OF PRESENTING ILLNESS:  63F spinal tumor excision with spasticity on baclofen presenting with palpitations x1 day. The patient denies prior hx palpitations or cardiac disease. Around 1030AM today she was sitting down when she suddenly felt heart racing. She also had very mild chest pressure. She had some LH and HA with this as well but denied SOB. Her smartwatch indicated a bpm of 204 after previously being 69bpm. It dipped down to 180s and went up back to 200s. This persisted for several hours. She went to [facility] and was told of SVT. 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      S/P tonsillectomy      S/P ORIF (open reduction internal fixation) fracture  left tibia/fibula fx      S/P arthroscopy of left knee            SOCIAL HISTORY: Substance Use (street drugs): ( x ) never used  (  ) other:    FAMILY HISTORY:  Family history of pulmonary fibrosis (Mother)    Family history of heart attack (Father)        REVIEW OF SYSTEMS:  CONSTITUTIONAL: No fever, weight loss, or fatigue  EYES: No eye pain, visual disturbances, or discharge  ENMT:  No difficulty hearing, tinnitus, vertigo; No sinus or throat pain  BREASTS: No pain, masses, or nipple discharge  GASTROINTESTINAL: No abdominal or epigastric pain. No nausea, vomiting, or hematemesis; No diarrhea or constipation. No melena or hematochezia.  GENITOURINARY: No dysuria, frequency, hematuria, or incontinence  NEUROLOGICAL: No headaches, memory loss, loss of strength, numbness, or tremors  ENDOCRINE: No heat or cold intolerance; No hair loss  MUSCULOSKELETAL: No joint pain or swelling; No muscle, back, or extremity pain  PSYCHIATRIC: No depression, anxiety, mood swings, or difficulty sleeping  HEME/LYMPH: No easy bruising, or bleeding gums  All others negative    MEDICATIONS:  enoxaparin Injectable 40 milliGRAM(s) SubCutaneous every 24 hours  metoprolol succinate ER 25 milliGRAM(s) Oral daily        acetaminophen     Tablet .. 650 milliGRAM(s) Oral every 6 hours PRN  baclofen 20 milliGRAM(s) Oral <User Schedule>  melatonin 3 milliGRAM(s) Oral at bedtime PRN            FAMILY HISTORY:  Family history of pulmonary fibrosis (Mother)    Family history of heart attack (Father)          Allergies    No Known Allergies    Intolerances    	      PHYSICAL EXAM:  T(C): 36 (24 @ 08:45), Max: 36.9 (24 @ 16:15)  HR: 63 (24 @ 12:43) (63 - 93)  BP: 96/62 (24 @ 12:43) (96/62 - 120/64)  RR: 15 (24 @ 12:43) (15 - 18)  SpO2: 98% (24 @ 12:43) (97% - 100%)  Wt(kg): --  I&O's Summary      GENERAL: NAD  EYES:  conjunctiva and sclera clear  ENMT: No tonsillar erythema, exudates, or enlargement  Cardiovascular: Normal S1 S2, No JVD, No murmurs, No edema  Respiratory: Lungs clear to auscultation	  Gastrointestinal:  Soft, Non-tender, + BS	  Extremities: No edema      LABS:	 	    CARDIAC MARKERS:                                  11.2   7.66  )-----------( 227      ( 2024 00:09 )             34.8         139  |  107  |  17  ----------------------------<  95  4.0   |  22  |  0.81    Ca    8.2<L>      2024 00:09  Phos  2.8       Mg     1.90         TPro  6.2  /  Alb  3.3  /  TBili  0.3  /  DBili  x   /  AST  20  /  ALT  12  /  AlkPhos  92      proBNP:   Lipid Profile:   HgA1c:   TSH: Thyroid Stimulating Hormone, Serum: 2.14 uIU/mL ( @ 18:29)      Consultant(s) Notes Reviewed:  [x ] YES  [ ] NO    Care Discussed with Consultants/Other Providers [ x] YES  [ ] NO    Imaging Personally Reviewed independently:  [x] YES  [ ] NO    All labs, radiologic studies, vitals, orders and medications list reviewed. Patient is seen and examined at bedside. Case discussed with medical team.    ASSESSMENT/PLAN: 	  
Date of Admission:    Patient is a 63y old  Female who presents with a chief complaint of palpitations (2024 15:06)      HISTORY OF PRESENT ILLNESS:   Ms. Reynaga is a 63F with a PMH of spinal tumor s/p excision in  on Baclofen for spasticity presenting with palpitations for 1 day. Patient reports she was sitting working on her computer when she felt palpitations. Has some LH/dizziness and some mild chest pressure but no shortness of breath. Her apple watch showed she had a HR > 200 and was not resolving and persisted. She went to urgent care and reportedly given adenosine 6mg x 1 at HR of 350bpm which returned her to NSR.     She drinks 1 10oz cup of coffee a day which she rarely finishes. Smokes 4 cigarettes a day. Denies alcohol or drug use. Reports she has palpitations about once a month which she usually sits down and takes a deep breath and goes away. No prior history of arrhythmias or syncope. No recent illnesses or infectious symptoms. No prior history of DVT/PE.     In the ED:  EKG showed NSR @ 72  HsT 108 -> 118      Allergies    No Known Allergies    Intolerances    	    MEDICATIONS:  enoxaparin Injectable 40 milliGRAM(s) SubCutaneous every 24 hours  metoprolol succinate ER 25 milliGRAM(s) Oral daily        acetaminophen     Tablet .. 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      S/P tonsillectomy      S/P ORIF (open reduction internal fixation) fracture  left tibia/fibula fx      S/P arthroscopy of left knee            FAMILY HISTORY:  Family history of pulmonary fibrosis (Mother)    Family history of heart attack (Father)        SOCIAL HISTORY:    [ ] Non-smoker  [X ] Smoker  [ ] Alcohol      REVIEW OF SYSTEMS:    CONSTITUTIONAL: No weakness, fevers or chills  EYES/ENT: No visual changes;  No dysphagia  NECK: No pain or stiffness  RESPIRATORY: No cough, wheezing, hemoptysis; No shortness of breath  CARDIOVASCULAR:  No lower extremity edema  GASTROINTESTINAL: No abdominal or epigastric pain. No nausea, vomiting, or hematemesis; No diarrhea or constipation. No melena or hematochezia.  BACK: No back pain  GENITOURINARY: No dysuria, frequency or hematuria  NEUROLOGICAL: No numbness or weakness  SKIN: No itching, burning, rashes, or lesions   All other review of systems is negative unless indicated above.  PHYSICAL EXAM:  T(C): 36.6 (24 @ 10:10), Max: 37.1 (24 @ 23:10)  HR: 64 (24 @ 10:10) (63 - 89)  BP: 112/64 (24 @ 10:10) (103/78 - 120/65)  RR: 18 (24 @ 10:10) (17 - 20)  SpO2: 98% (24 @ 10:10) (97% - 98%)  Wt(kg): --  I&O's Summary      Appearance: Normal	  HEENT:   Normal oral mucosa, PERRL, EOMI	  Lymphatic: No lymphadenopathy  Cardiovascular: Normal S1 S2, No JVD, No murmurs, No edema  Respiratory: Lungs clear to auscultation	  Psychiatry: A & O x 3, Mood & affect appropriate  Gastrointestinal:  Soft, Non-tender, + BS	  Skin: No rashes, No ecchymoses, No cyanosis	  Neurologic: Non-focal  Extremities: Normal range of motion, No clubbing, cyanosis or edema  Vascular: Peripheral pulses palpable 2+ bilaterally        LABS:	 	    CBC Full  -  ( 2024 05:47 )  WBC Count : 6.82 K/uL  Hemoglobin : 11.4 g/dL  Hematocrit : 36.4 %  Platelet Count - Automated : 233 K/uL  Mean Cell Volume : 80.7 fL  Mean Cell Hemoglobin : 25.3 pg  Mean Cell Hemoglobin Concentration : 31.3 gm/dL  Auto Neutrophil # : x  Auto Lymphocyte # : x  Auto Monocyte # : x  Auto Eosinophil # : x  Auto Basophil # : x  Auto Neutrophil % : x  Auto Lymphocyte % : x  Auto Monocyte % : x  Auto Eosinophil % : x  Auto Basophil % : x        141  |  107  |  15  ----------------------------<  86  4.0   |  25  |  0.77  01-18    142  |  108<H>  |  18  ----------------------------<  89  4.0   |  25  |  0.75    Ca    8.4      2024 05:47  Ca    7.8<L>      2024 05:43  Phos  2.8     -18  Mg     1.80     -19  Mg     1.80     -18    TPro  5.9<L>  /  Alb  3.2<L>  /  TBili  0.3  /  DBili  x   /  AST  15  /  ALT  12  /  AlkPhos  90        proBNP:   Lipid Profile:   HgA1c:   TSH: 2.14      CARDIAC MARKERS:      HsT 108 -> 118 -> 109 -> 76  CK 95 -> 105  CKMB 4.9 -> 4.6      TELEMETRY: NSR 50s-70s	    	    	    PREVIOUS DIAGNOSTIC TESTING:    [ X] Echocardiogram:      TRANSTHORACIC ECHOCARDIOGRAM REPORT  ________________________________________________________________________________                                      _______       Pt. Name:       BRIDGER REYNAGA Study Date:    2024  MRN:            [id number]     YOB: 1960  Accession #:    [id number]     Age:           63 years  Account#:       [id number]      Gender:        F  Heart Rate:                   Height:        64.00 in (162.56 cm)  Rhythm:                       Weight:        180.00 lb (81.65 kg)  Blood Pressure: 114/66 mmHg   BSA/BMI:       1.87 m² / 30.90 kg/m²  ________________________________________________________________________________________  Referring Physician:    [id number] Pancho Angela  Interpreting Physician: Serge Javier MD  Primary Sonographer:    Irene JOLLEY    CPT:               ECHO TTE WO CON COMP W DOPP - 83207.m  Indication(s):     Abnormal electrocardiogram ECG/EKG - R94.31  Procedure:         Transthoracic echocardiogram with 2-D, M-mode and complete                     spectral and color flow Doppler.  Ordering Location: [facility]  Admission Status:  ED  Study Information: Image quality for this study is fair.    _______________________________________________________________________________________     CONCLUSIONS:      1. Left ventricular cavity is normal. Left ventricular wall thickness is normal.   2. Left ventricular endocardium is not well visualized; however, the left ventricular systolic function appears grossly normal.   3. The right ventricle is not well visualized. probably normal systolic function.   4. No significant valvular disease.   5. No pericardial effusion seen.    ________________________________________________________________________________________

## 2024-01-19 NOTE — CONSULT NOTE ADULT - ASSESSMENT
Ms. Carey is a 63F with a PMH of spinal tumor s/p excision in 1973 on Baclofen for spasticity presenting with palpitations for 1 day in the setting of SVT requiring adenosine 6mg x 1.    1. 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 Elevated troponin - likely demand in the setting of SVT, troponin peaked to 118    RECOMMENDATIONS:  - Currently in NSR on Toprol 25mg daily however patient says combined with baclofen patient feels drowsy/fatigued  - Is agreeable to ablation and wishes to stay inpatient for it, will be early next week  - TTE normal LVEF and no structural or valvular abnormalities seen  - TSH normal  - Keep Mg > 2 and K > 4  - Monitor on telemetry    Note not final until signed by attending       Maximiliano Puentes MD  Cardiology Fellow PGY-6  Phone: [phone]    For all New Consults  [email or web address]   Login: [email or web address]

## 2024-01-19 NOTE — PATIENT PROFILE ADULT - FALL HARM RISK - HARM RISK INTERVENTIONS

## 2024-01-19 NOTE — CONSULT NOTE ADULT - NS ATTEND AMEND GEN_ALL_CORE FT
Ms. Carey is a 63F with a PMH of spinal tumor s/p excision in 1973 on Baclofen for spasticity presenting with palpitations for 1 day in the setting of SVT requiring adenosine 6mg x 1. Currently in NSR on Toprol 25mg daily however patient says combined with baclofen patient feels drowsy/fatigued  - Is agreeable to ablation and wishes to stay inpatient for it, will be early next week. TTE normal LVEF and no structural or valvular abnormalities seen  - TSH normal

## 2024-01-19 NOTE — PROGRESS NOTE ADULT - SUBJECTIVE AND OBJECTIVE BOX
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      S/P tonsillectomy      S/P ORIF (open reduction internal fixation) fracture  left tibia/fibula fx      S/P arthroscopy of left knee            SOCIAL HISTORY  Alcohol:  Tobacco:  Illicit substance use:    FAMILY HISTORY:    REVIEW OF SYSTEMS:  CONSTITUTIONAL: No fever, weight loss, or fatigue  EYES: No eye pain, visual disturbances, or discharge  ENMT:  No difficulty hearing, tinnitus, vertigo; No sinus or throat pain  NECK: No pain or stiffness  RESPIRATORY: No cough, wheezing, chills or hemoptysis; No shortness of breath  CARDIOVASCULAR: No chest pain, palpitations, dizziness, or leg swelling  GASTROINTESTINAL: No abdominal or epigastric pain. No nausea, vomiting, or hematemesis; No diarrhea or constipation. No melena or hematochezia.  GENITOURINARY: No dysuria, frequency, hematuria, or incontinence  NEUROLOGICAL: No headaches, memory loss, loss of strength, numbness, or tremors  SKIN: No itching, burning, rashes, or lesions   LYMPH NODES: No enlarged glands  ENDOCRINE: No heat or cold intolerance; No hair loss  MUSCULOSKELETAL: No joint pain or swelling; No muscle, back, or extremity pain  PSYCHIATRIC: No depression, anxiety, mood swings, or difficulty sleeping  HEME/LYMPH: No easy bruising, or bleeding gums  ALLERY AND IMMUNOLOGIC: No hives or eczema    RADIOLOGY & ADDITIONAL TESTS:    Imaging Personally Reviewed:  [ ] YES  [ ] NO    Consultant(s) Notes Reviewed:  [ ] YES  [ ] NO    PHYSICAL EXAM:  GENERAL: NAD, well-groomed, well-developed  HEAD:  Atraumatic, Normocephalic  EYES: EOMI, PERRLA, conjunctiva and sclera clear  ENMT: No tonsillar erythema, exudates, or enlargement; Moist mucous membranes, Good dentition, No lesions  NECK: Supple, No JVD, Normal thyroid  NERVOUS SYSTEM:  Alert & Oriented X3, Good concentration; Motor Strength 5/5 B/L upper and lower extremities; DTRs 2+ intact and symmetric  CHEST/LUNG: Clear to percussion bilaterally; No rales, rhonchi, wheezing, or rubs  HEART: Regular rate and rhythm; No murmurs, rubs, or gallops  ABDOMEN: Soft, Nontender, Nondistended; Bowel sounds present  EXTREMITIES:  2+ Peripheral Pulses, No clubbing, cyanosis, or edema  LYMPH: No lymphadenopathy noted  SKIN: No rashes or lesions    LABS:                        11.4   6.82  )-----------( 233      ( 2024 05:47 )             36.4     -    141  |  107  |  15  ----------------------------<  86  4.0   |  25  |  0.77    Ca    8.4      2024 05:47  Phos  2.8     -  Mg     1.80         TPro  5.9<L>  /  Alb  3.2<L>  /  TBili  0.3  /  DBili  x   /  AST  15  /  ALT  12  /  AlkPhos  90        Urinalysis Basic - ( 2024 05:47 )    Color: x / Appearance: x / SG: x / pH: x  Gluc: 86 mg/dL / Ketone: x  / Bili: x / Urobili: x   Blood: x / Protein: x / Nitrite: x   Leuk Esterase: x / RBC: x / WBC x   Sq Epi: x / Non Sq Epi: x / Bacteria: x      CAPILLARY BLOOD GLUCOSE            Urinalysis Basic - ( 2024 05:47 )    Color: x / Appearance: x / SG: x / pH: x  Gluc: 86 mg/dL / Ketone: x  / Bili: x / Urobili: x   Blood: x / Protein: x / Nitrite: x   Leuk Esterase: x / RBC: x / WBC x   Sq Epi: x / Non Sq Epi: x / Bacteria: x        MEDICATIONS  (STANDING):  baclofen 20 milliGRAM(s) Oral <User Schedule>  enoxaparin Injectable 40 milliGRAM(s) SubCutaneous every 24 hours  metoprolol succinate ER 25 milliGRAM(s) Oral daily    MEDICATIONS  (PRN):  acetaminophen     Tablet .. 650 milliGRAM(s) Oral every 6 hours PRN Temp greater or equal to 38C (100.4F), Mild Pain (1 - 3)  melatonin 3 milliGRAM(s) Oral at bedtime PRN Insomnia      Care Discussed with Consultants/Other Providers [ ] YES  [ ] NO

## 2024-01-19 NOTE — PROGRESS NOTE ADULT - ASSESSMENT
EKG SVT likelt AVNRT     Assessment and Plan     1) SVT : resolved with adenosine   - will get EP input   - monitor on tele   - echo grossly normal LV   - c/w metoprolol     2) HTN c/w metoprolol        3) NSTEMI  - Denies CP , likely 2/2 demand   - echo normal   - get nuclear stress     DVT PPX   Lovenox

## 2024-01-19 NOTE — PROGRESS NOTE ADULT - SUBJECTIVE AND OBJECTIVE BOX
Pancho Angela MD  Interventional Cardiology / Endovascular Specialist  [place] Office : [address] [place] [address] [place] [address]  Tel:   [place] Office : [address] [facility] [place] [address]  Tel: [phone]    Pt lyinging in bed in NAD   	  MEDICATIONS:  enoxaparin Injectable 40 milliGRAM(s) SubCutaneous every 24 hours  metoprolol succinate ER 25 milliGRAM(s) Oral daily        acetaminophen     Tablet .. 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      S/P tonsillectomy      S/P ORIF (open reduction internal fixation) fracture  left tibia/fibula fx      S/P arthroscopy of left knee            SOCIAL HISTORY: Substance Use (street drugs): ( x ) never used  (  ) other:    FAMILY HISTORY:  Family history of pulmonary fibrosis (Mother)    Family history of heart attack (Father)        REVIEW OF SYSTEMS:  CONSTITUTIONAL: No fever, weight loss, or fatigue  EYES: No eye pain, visual disturbances, or discharge  ENMT:  No difficulty hearing, tinnitus, vertigo; No sinus or throat pain  BREASTS: No pain, masses, or nipple discharge  GASTROINTESTINAL: No abdominal or epigastric pain. No nausea, vomiting, or hematemesis; No diarrhea or constipation. No melena or hematochezia.  GENITOURINARY: No dysuria, frequency, hematuria, or incontinence  NEUROLOGICAL: No headaches, memory loss, loss of strength, numbness, or tremors  ENDOCRINE: No heat or cold intolerance; No hair loss  MUSCULOSKELETAL: No joint pain or swelling; No muscle, back, or extremity pain  PSYCHIATRIC: No depression, anxiety, mood swings, or difficulty sleeping  HEME/LYMPH: No easy bruising, or bleeding gums  All others negative    PHYSICAL EXAM:  T(C): 36.6 (24 @ 10:10), Max: 37.1 (24 @ 23:10)  HR: 64 (24 @ 10:10) (63 - 89)  BP: 112/64 (24 @ 10:10) (103/78 - 120/65)  RR: 18 (24 @ 10:10) (17 - 20)  SpO2: 98% (24 @ 10:10) (97% - 98%)  Wt(kg): --  I&O's Summary    GENERAL: NAD  EYES:  conjunctiva and sclera clear  ENMT: No tonsillar erythema, exudates, or enlargement  Cardiovascular: Normal S1 S2, No JVD, No murmurs, No edema  Respiratory: Lungs clear to auscultation	  Gastrointestinal:  Soft, Non-tender, + BS	  Extremities: No edema                          11.4   6.82  )-----------( 233      ( 2024 05:47 )             36.4         141  |  107  |  15  ----------------------------<  86  4.0   |  25  |  0.77    Ca    8.4      2024 05:47  Phos  2.8       Mg     1.80         TPro  5.9<L>  /  Alb  3.2<L>  /  TBili  0.3  /  DBili  x   /  AST  15  /  ALT  12  /  AlkPhos  90      proBNP:   Lipid Profile:   HgA1c:   TSH:     Consultant(s) Notes Reviewed:  [x ] YES  [ ] NO    Care Discussed with Consultants/Other Providers [ x] YES  [ ] NO    Imaging Personally Reviewed independently:  [x] YES  [ ] NO    All labs, radiologic studies, vitals, orders and medications list reviewed. Patient is seen and examined at bedside. Case discussed with medical team.

## 2024-01-20 LAB
ANION GAP SERPL CALC-SCNC: 8 MMOL/L — SIGNIFICANT CHANGE UP (ref 7–14)
BUN SERPL-MCNC: 13 MG/DL — SIGNIFICANT CHANGE UP (ref 7–23)
CALCIUM SERPL-MCNC: 8.3 MG/DL — LOW (ref 8.4–10.5)
CHLORIDE SERPL-SCNC: 106 MMOL/L — SIGNIFICANT CHANGE UP (ref 98–107)
CO2 SERPL-SCNC: 28 MMOL/L — SIGNIFICANT CHANGE UP (ref 22–31)
CREAT SERPL-MCNC: 0.73 MG/DL — SIGNIFICANT CHANGE UP (ref 0.5–1.3)
EGFR: 94 ML/MIN/1.73M2 — SIGNIFICANT CHANGE UP
GLUCOSE SERPL-MCNC: 93 MG/DL — SIGNIFICANT CHANGE UP (ref 70–99)
HCT VFR BLD CALC: 36.4 % — SIGNIFICANT CHANGE UP (ref 34.5–45)
HGB BLD-MCNC: 11.9 G/DL — SIGNIFICANT CHANGE UP (ref 11.5–15.5)
MAGNESIUM SERPL-MCNC: 1.9 MG/DL — SIGNIFICANT CHANGE UP (ref 1.6–2.6)
MCHC RBC-ENTMCNC: 26.1 PG — LOW (ref 27–34)
MCHC RBC-ENTMCNC: 32.7 GM/DL — SIGNIFICANT CHANGE UP (ref 32–36)
MCV RBC AUTO: 79.8 FL — LOW (ref 80–100)
NRBC # BLD: 0 /100 WBCS — SIGNIFICANT CHANGE UP (ref 0–0)
NRBC # FLD: 0 K/UL — SIGNIFICANT CHANGE UP (ref 0–0)
PHOSPHATE SERPL-MCNC: 3.4 MG/DL — SIGNIFICANT CHANGE UP (ref 2.5–4.5)
PLATELET # BLD AUTO: 243 K/UL — SIGNIFICANT CHANGE UP (ref 150–400)
POTASSIUM SERPL-MCNC: 4.2 MMOL/L — SIGNIFICANT CHANGE UP (ref 3.5–5.3)
POTASSIUM SERPL-SCNC: 4.2 MMOL/L — SIGNIFICANT CHANGE UP (ref 3.5–5.3)
RBC # BLD: 4.56 M/UL — SIGNIFICANT CHANGE UP (ref 3.8–5.2)
RBC # FLD: 13.5 % — SIGNIFICANT CHANGE UP (ref 10.3–14.5)
SODIUM SERPL-SCNC: 142 MMOL/L — SIGNIFICANT CHANGE UP (ref 135–145)
WBC # BLD: 7.89 K/UL — SIGNIFICANT CHANGE UP (ref 3.8–10.5)
WBC # FLD AUTO: 7.89 K/UL — SIGNIFICANT CHANGE UP (ref 3.8–10.5)

## 2024-01-20 RX ORDER — METOPROLOL TARTRATE 50 MG
25 TABLET ORAL DAILY
Refills: 0 | Status: DISCONTINUED | OUTPATIENT
Start: 2024-01-20 | End: 2024-01-24

## 2024-01-20 RX ADMIN — ENOXAPARIN SODIUM 40 MILLIGRAM(S): 100 INJECTION SUBCUTANEOUS at 13:46

## 2024-01-20 RX ADMIN — Medication 20 MILLIGRAM(S): at 21:24

## 2024-01-20 RX ADMIN — Medication 20 MILLIGRAM(S): at 15:55

## 2024-01-20 RX ADMIN — Medication 20 MILLIGRAM(S): at 08:58

## 2024-01-20 RX ADMIN — Medication 20 MILLIGRAM(S): at 12:00

## 2024-01-20 RX ADMIN — Medication 25 MILLIGRAM(S): at 09:37

## 2024-01-20 NOTE — PROGRESS NOTE ADULT - SUBJECTIVE AND OBJECTIVE BOX
Pancho Agnela MD  Interventional Cardiology / Endovascular Specialist  [place] Office : [address] [place] [address] [place] [address]  Tel:   [place] Office : [address] [facility] [place] [address]  Tel: [phone]    Pt lyinging in bed in NAD   	  MEDICATIONS:  enoxaparin Injectable 40 milliGRAM(s) SubCutaneous every 24 hours  metoprolol succinate ER 25 milliGRAM(s) Oral daily        acetaminophen     Tablet .. 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      S/P tonsillectomy      S/P ORIF (open reduction internal fixation) fracture  left tibia/fibula fx      S/P arthroscopy of left knee            SOCIAL HISTORY: Substance Use (street drugs): ( x ) never used  (  ) other:    FAMILY HISTORY:  Family history of pulmonary fibrosis (Mother)    Family history of heart attack (Father)        REVIEW OF SYSTEMS:  CONSTITUTIONAL: No fever, weight loss, or fatigue  EYES: No eye pain, visual disturbances, or discharge  ENMT:  No difficulty hearing, tinnitus, vertigo; No sinus or throat pain  BREASTS: No pain, masses, or nipple discharge  GASTROINTESTINAL: No abdominal or epigastric pain. No nausea, vomiting, or hematemesis; No diarrhea or constipation. No melena or hematochezia.  GENITOURINARY: No dysuria, frequency, hematuria, or incontinence  NEUROLOGICAL: No headaches, memory loss, loss of strength, numbness, or tremors  ENDOCRINE: No heat or cold intolerance; No hair loss  MUSCULOSKELETAL: No joint pain or swelling; No muscle, back, or extremity pain  PSYCHIATRIC: No depression, anxiety, mood swings, or difficulty sleeping  HEME/LYMPH: No easy bruising, or bleeding gums  All others negative    PHYSICAL EXAM:  T(C): 36.5 (24 @ 11:04), Max: 37.1 (24 @ 16:02)  HR: 66 (24 @ 11:04) (64 - 79)  BP: 97/54 (24 @ 11:04) (96/54 - 120/50)  RR: 17 (24 @ 11:04) (17 - 18)  SpO2: 98% (24 @ 11:04) (98% - 100%)  Wt(kg): --  I&O's Summary    2024 07:01  -  2024 07:00  --------------------------------------------------------  IN: 0 mL / OUT: 1800 mL / NET: -1800 mL      Height (cm): 162.6 ( @ 01:47)  Weight (kg): 81.647 ( @ 01:47)  BMI (kg/m2): 30.9 ( @ 01:47)  BSA (m2): 1.87 ( @ 01:47)        GENERAL: NAD  EYES:  conjunctiva and sclera clear  ENMT: No tonsillar erythema, exudates, or enlargement  Cardiovascular: Normal S1 S2, No JVD, No murmurs, No edema  Respiratory: Lungs clear to auscultation	  Gastrointestinal:  Soft, Non-tender, + BS	  Extremities: No edema                              11.9   7.89  )-----------( 243      ( 2024 07:27 )             36.4         142  |  106  |  13  ----------------------------<  93  4.2   |  28  |  0.73    Ca    8.3<L>      2024 07:27  Phos  3.4       Mg     1.90         TPro  5.9<L>  /  Alb  3.2<L>  /  TBili  0.3  /  DBili  x   /  AST  15  /  ALT  12  /  AlkPhos  90      proBNP:   Lipid Profile:   HgA1c:   TSH:     Consultant(s) Notes Reviewed:  [x ] YES  [ ] NO    Care Discussed with Consultants/Other Providers [ x] YES  [ ] NO    Imaging Personally Reviewed independently:  [x] YES  [ ] NO    All labs, radiologic studies, vitals, orders and medications list reviewed. Patient is seen and examined at bedside. Case discussed with medical team.

## 2024-01-20 NOTE — PROGRESS NOTE ADULT - ASSESSMENT
EKG SVT likelt AVNRT     Assessment and Plan     1) SVT : resolved with adenosine   - monitor on tele   - echo grossly normal LV   - c/w metoprolol     2) HTN c/w metoprolol        3) NSTEMI  - Denies CP , likely 2/2 demand   - echo normal   - get nuclear stress     DVT PPX   Lovenox

## 2024-01-21 LAB
ANION GAP SERPL CALC-SCNC: 9 MMOL/L — SIGNIFICANT CHANGE UP (ref 7–14)
BUN SERPL-MCNC: 14 MG/DL — SIGNIFICANT CHANGE UP (ref 7–23)
CALCIUM SERPL-MCNC: 8.4 MG/DL — SIGNIFICANT CHANGE UP (ref 8.4–10.5)
CHLORIDE SERPL-SCNC: 105 MMOL/L — SIGNIFICANT CHANGE UP (ref 98–107)
CO2 SERPL-SCNC: 25 MMOL/L — SIGNIFICANT CHANGE UP (ref 22–31)
CREAT SERPL-MCNC: 0.74 MG/DL — SIGNIFICANT CHANGE UP (ref 0.5–1.3)
EGFR: 92 ML/MIN/1.73M2 — SIGNIFICANT CHANGE UP
GLUCOSE SERPL-MCNC: 97 MG/DL — SIGNIFICANT CHANGE UP (ref 70–99)
HCT VFR BLD CALC: 36.8 % — SIGNIFICANT CHANGE UP (ref 34.5–45)
HGB BLD-MCNC: 11.9 G/DL — SIGNIFICANT CHANGE UP (ref 11.5–15.5)
MAGNESIUM SERPL-MCNC: 2 MG/DL — SIGNIFICANT CHANGE UP (ref 1.6–2.6)
MCHC RBC-ENTMCNC: 25.8 PG — LOW (ref 27–34)
MCHC RBC-ENTMCNC: 32.3 GM/DL — SIGNIFICANT CHANGE UP (ref 32–36)
MCV RBC AUTO: 79.7 FL — LOW (ref 80–100)
NRBC # BLD: 0 /100 WBCS — SIGNIFICANT CHANGE UP (ref 0–0)
NRBC # FLD: 0 K/UL — SIGNIFICANT CHANGE UP (ref 0–0)
PHOSPHATE SERPL-MCNC: 3.3 MG/DL — SIGNIFICANT CHANGE UP (ref 2.5–4.5)
PLATELET # BLD AUTO: 250 K/UL — SIGNIFICANT CHANGE UP (ref 150–400)
POTASSIUM SERPL-MCNC: 4.2 MMOL/L — SIGNIFICANT CHANGE UP (ref 3.5–5.3)
POTASSIUM SERPL-SCNC: 4.2 MMOL/L — SIGNIFICANT CHANGE UP (ref 3.5–5.3)
RBC # BLD: 4.62 M/UL — SIGNIFICANT CHANGE UP (ref 3.8–5.2)
RBC # FLD: 13.3 % — SIGNIFICANT CHANGE UP (ref 10.3–14.5)
SODIUM SERPL-SCNC: 139 MMOL/L — SIGNIFICANT CHANGE UP (ref 135–145)
WBC # BLD: 7.82 K/UL — SIGNIFICANT CHANGE UP (ref 3.8–10.5)
WBC # FLD AUTO: 7.82 K/UL — SIGNIFICANT CHANGE UP (ref 3.8–10.5)

## 2024-01-21 RX ADMIN — Medication 20 MILLIGRAM(S): at 16:26

## 2024-01-21 RX ADMIN — Medication 20 MILLIGRAM(S): at 12:46

## 2024-01-21 RX ADMIN — Medication 25 MILLIGRAM(S): at 05:29

## 2024-01-21 RX ADMIN — Medication 20 MILLIGRAM(S): at 07:59

## 2024-01-21 RX ADMIN — Medication 10 MILLIGRAM(S): at 16:26

## 2024-01-21 RX ADMIN — Medication 20 MILLIGRAM(S): at 20:19

## 2024-01-21 RX ADMIN — ENOXAPARIN SODIUM 40 MILLIGRAM(S): 100 INJECTION SUBCUTANEOUS at 16:26

## 2024-01-21 NOTE — PROGRESS NOTE ADULT - ASSESSMENT
63F spinal tumor excision with spasticity on baclofen presenting with palpitations x1 day. Found to have SVT (1st episode) resolved s/p adenosine      # SVT (supraventricular tachycardia) / palpitation   improved   seen by EP and cardio   started on lopressor     Chest pain :   now resolved   2/2 SVT   likely demand ischemia   -echo / NST   seen by cardio     #Muscle spasticity.   ·  Plan: Takes baclofen 20mg @ 8AM, 40mg @ noon and 20mg at 8PM  -pain control as ordered    dispo: awaiting NST

## 2024-01-21 NOTE — PROGRESS NOTE ADULT - ASSESSMENT
EKG SVT likelt AVNRT     Assessment and Plan     1) SVT : resolved with adenosine   - monitor on tele   - echo grossly normal LV   - c/w metoprolol   - EP on board     2) HTN c/w metoprolol        3) NSTEMI  - Denies CP , likely 2/2 demand   - echo normal   - get nuclear stress     DVT PPX   Lovenox

## 2024-01-21 NOTE — PROGRESS NOTE ADULT - SUBJECTIVE AND OBJECTIVE BOX
Pancho Angela MD  Interventional Cardiology / Endovascular Specialist  [place] Office : [address] [place] [address] [place] [address]  Tel:   [place] Office : [address] [facility] [place] [address]  Tel: [phone]    Pt lyinging in bed in NAD   	  MEDICATIONS:  enoxaparin Injectable 40 milliGRAM(s) SubCutaneous every 24 hours  metoprolol succinate ER 25 milliGRAM(s) Oral daily        acetaminophen     Tablet .. 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      S/P tonsillectomy      S/P ORIF (open reduction internal fixation) fracture  left tibia/fibula fx      S/P arthroscopy of left knee            SOCIAL HISTORY: Substance Use (street drugs): ( x ) never used  (  ) other:    FAMILY HISTORY:  Family history of pulmonary fibrosis (Mother)    Family history of heart attack (Father)        REVIEW OF SYSTEMS:  CONSTITUTIONAL: No fever, weight loss, or fatigue  EYES: No eye pain, visual disturbances, or discharge  ENMT:  No difficulty hearing, tinnitus, vertigo; No sinus or throat pain  BREASTS: No pain, masses, or nipple discharge  GASTROINTESTINAL: No abdominal or epigastric pain. No nausea, vomiting, or hematemesis; No diarrhea or constipation. No melena or hematochezia.  GENITOURINARY: No dysuria, frequency, hematuria, or incontinence  NEUROLOGICAL: No headaches, memory loss, loss of strength, numbness, or tremors  ENDOCRINE: No heat or cold intolerance; No hair loss  MUSCULOSKELETAL: No joint pain or swelling; No muscle, back, or extremity pain  PSYCHIATRIC: No depression, anxiety, mood swings, or difficulty sleeping  HEME/LYMPH: No easy bruising, or bleeding gums  All others negative    PHYSICAL EXAM:  T(C): 36.8 (24 @ 19:36), Max: 36.8 (24 @ 05:20)  HR: 86 (24 @ 19:36) (62 - 86)  BP: 101/51 (24 @ 19:36) (98/51 - 113/88)  RR: 18 (24 @ 19:36) (17 - 18)  SpO2: 98% (24 @ 19:36) (98% - 100%)  Wt(kg): --  I&O's Summary    2024 07:01  -  2024 07:00  --------------------------------------------------------  IN: 0 mL / OUT: 1400 mL / NET: -1400 mL    2024 07:01  -  2024 22:29  --------------------------------------------------------  IN: 600 mL / OUT: 800 mL / NET: -200 mL      GENERAL: NAD  EYES:  conjunctiva and sclera clear  ENMT: No tonsillar erythema, exudates, or enlargement  Cardiovascular: Normal S1 S2, No JVD, No murmurs, No edema  Respiratory: Lungs clear to auscultation	  Gastrointestinal:  Soft, Non-tender, + BS	  Extremities: No edema                            11.9   7.82  )-----------( 250      ( 2024 07:36 )             36.8         139  |  105  |  14  ----------------------------<  97  4.2   |  25  |  0.74    Ca    8.4      2024 07:36  Phos  3.3       Mg     2.00           proBNP:   Lipid Profile:   HgA1c:   TSH:     Consultant(s) Notes Reviewed:  [x ] YES  [ ] NO    Care Discussed with Consultants/Other Providers [ x] YES  [ ] NO    Imaging Personally Reviewed independently:  [x] YES  [ ] NO    All labs, radiologic studies, vitals, orders and medications list reviewed. Patient is seen and examined at bedside. Case discussed with medical team.

## 2024-01-21 NOTE — PROGRESS NOTE ADULT - SUBJECTIVE AND OBJECTIVE BOX
Patient is a 63y old  Female who presents with a chief complaint of palpitations (2024 13:50)      INTERVAL HPI/OVERNIGHT EVENTS: doing well , awaiting NST   T(C): 36.8 (24 @ 19:36), Max: 36.8 (24 @ 05:20)  HR: 86 (24 @ 19:36) (62 - 86)  BP: 101/51 (24 @ 19:36) (98/51 - 113/88)  RR: 18 (24 @ 19:36) (17 - 18)  SpO2: 98% (24 @ 19:36) (98% - 100%)  Wt(kg): --  I&O's Summary    2024 07:01  -  2024 07:00  --------------------------------------------------------  IN: 0 mL / OUT: 1400 mL / NET: -1400 mL    2024 07:01  -  2024 21:55  --------------------------------------------------------  IN: 600 mL / OUT: 800 mL / NET: -200 mL        PAST MEDICAL & SURGICAL HISTORY:  Kidney stone      Carpal tunnel syndrome on both sides      Paralysis  from waist down due to spinal surgery      Myelopathy of thoracic region        H/O:       S/P spinal surgery  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      S/P tonsillectomy      S/P ORIF (open reduction internal fixation) fracture  left tibia/fibula fx      S/P arthroscopy of left knee            SOCIAL HISTORY  Alcohol:  Tobacco:  Illicit substance use:    FAMILY HISTORY:    REVIEW OF SYSTEMS:  CONSTITUTIONAL: No fever, weight loss, or fatigue  EYES: No eye pain, visual disturbances, or discharge  ENMT:  No difficulty hearing, tinnitus, vertigo; No sinus or throat pain  NECK: No pain or stiffness  RESPIRATORY: No cough, wheezing, chills or hemoptysis; No shortness of breath  CARDIOVASCULAR: No chest pain, palpitations, dizziness, or leg swelling  GASTROINTESTINAL: No abdominal or epigastric pain. No nausea, vomiting, or hematemesis; No diarrhea or constipation. No melena or hematochezia.  GENITOURINARY: No dysuria, frequency, hematuria, or incontinence  NEUROLOGICAL: No headaches, memory loss, loss of strength, numbness, or tremors  SKIN: No itching, burning, rashes, or lesions   LYMPH NODES: No enlarged glands  ENDOCRINE: No heat or cold intolerance; No hair loss  MUSCULOSKELETAL: No joint pain or swelling; No muscle, back, or extremity pain  PSYCHIATRIC: No depression, anxiety, mood swings, or difficulty sleeping  HEME/LYMPH: No easy bruising, or bleeding gums  ALLERY AND IMMUNOLOGIC: No hives or eczema    RADIOLOGY & ADDITIONAL TESTS:    Imaging Personally Reviewed:  [ ] YES  [ ] NO    Consultant(s) Notes Reviewed:  [ ] YES  [ ] NO    PHYSICAL EXAM:  GENERAL: NAD, well-groomed, well-developed  HEAD:  Atraumatic, Normocephalic  EYES: EOMI, PERRLA, conjunctiva and sclera clear  ENMT: No tonsillar erythema, exudates, or enlargement; Moist mucous membranes, Good dentition, No lesions  NECK: Supple, No JVD, Normal thyroid  NERVOUS SYSTEM:  Alert & Oriented X3, Good concentration; Motor Strength 5/5 B/L upper and lower extremities; DTRs 2+ intact and symmetric  CHEST/LUNG: Clear to percussion bilaterally; No rales, rhonchi, wheezing, or rubs  HEART: Regular rate and rhythm; No murmurs, rubs, or gallops  ABDOMEN: Soft, Nontender, Nondistended; Bowel sounds present  EXTREMITIES:  2+ Peripheral Pulses, No clubbing, cyanosis, or edema  LYMPH: No lymphadenopathy noted  SKIN: No rashes or lesions    LABS:                        11.9   7.82  )-----------( 250      ( 2024 07:36 )             36.8     -    139  |  105  |  14  ----------------------------<  97  4.2   |  25  |  0.74    Ca    8.4      2024 07:36  Phos  3.3     -  Mg     2.00     -        Urinalysis Basic - ( 2024 07:36 )    Color: x / Appearance: x / SG: x / pH: x  Gluc: 97 mg/dL / Ketone: x  / Bili: x / Urobili: x   Blood: x / Protein: x / Nitrite: x   Leuk Esterase: x / RBC: x / WBC x   Sq Epi: x / Non Sq Epi: x / Bacteria: x      CAPILLARY BLOOD GLUCOSE            Urinalysis Basic - ( 2024 07:36 )    Color: x / Appearance: x / SG: x / pH: x  Gluc: 97 mg/dL / Ketone: x  / Bili: x / Urobili: x   Blood: x / Protein: x / Nitrite: x   Leuk Esterase: x / RBC: x / WBC x   Sq Epi: x / Non Sq Epi: x / Bacteria: x        MEDICATIONS  (STANDING):  baclofen 20 milliGRAM(s) Oral <User Schedule>  enoxaparin Injectable 40 milliGRAM(s) SubCutaneous every 24 hours  metoprolol succinate ER 25 milliGRAM(s) Oral daily    MEDICATIONS  (PRN):  acetaminophen     Tablet .. 650 milliGRAM(s) Oral every 6 hours PRN Temp greater or equal to 38C (100.4F), Mild Pain (1 - 3)  bisacodyl Suppository 10 milliGRAM(s) Rectal daily PRN Constipation  melatonin 3 milliGRAM(s) Oral at bedtime PRN Insomnia      Care Discussed with Consultants/Other Providers [ ] YES  [ ] NO

## 2024-01-22 LAB
ANION GAP SERPL CALC-SCNC: 11 MMOL/L — SIGNIFICANT CHANGE UP (ref 7–14)
BLD GP AB SCN SERPL QL: NEGATIVE — SIGNIFICANT CHANGE UP
BUN SERPL-MCNC: 20 MG/DL — SIGNIFICANT CHANGE UP (ref 7–23)
CALCIUM SERPL-MCNC: 8.2 MG/DL — LOW (ref 8.4–10.5)
CHLORIDE SERPL-SCNC: 106 MMOL/L — SIGNIFICANT CHANGE UP (ref 98–107)
CO2 SERPL-SCNC: 26 MMOL/L — SIGNIFICANT CHANGE UP (ref 22–31)
CREAT SERPL-MCNC: 0.76 MG/DL — SIGNIFICANT CHANGE UP (ref 0.5–1.3)
EGFR: 89 ML/MIN/1.73M2 — SIGNIFICANT CHANGE UP
GLUCOSE SERPL-MCNC: 98 MG/DL — SIGNIFICANT CHANGE UP (ref 70–99)
HCT VFR BLD CALC: 37.2 % — SIGNIFICANT CHANGE UP (ref 34.5–45)
HGB BLD-MCNC: 11.8 G/DL — SIGNIFICANT CHANGE UP (ref 11.5–15.5)
MAGNESIUM SERPL-MCNC: 1.9 MG/DL — SIGNIFICANT CHANGE UP (ref 1.6–2.6)
MCHC RBC-ENTMCNC: 25.4 PG — LOW (ref 27–34)
MCHC RBC-ENTMCNC: 31.7 GM/DL — LOW (ref 32–36)
MCV RBC AUTO: 80 FL — SIGNIFICANT CHANGE UP (ref 80–100)
NRBC # BLD: 0 /100 WBCS — SIGNIFICANT CHANGE UP (ref 0–0)
NRBC # FLD: 0 K/UL — SIGNIFICANT CHANGE UP (ref 0–0)
PHOSPHATE SERPL-MCNC: 3.5 MG/DL — SIGNIFICANT CHANGE UP (ref 2.5–4.5)
PLATELET # BLD AUTO: 239 K/UL — SIGNIFICANT CHANGE UP (ref 150–400)
POTASSIUM SERPL-MCNC: 4.4 MMOL/L — SIGNIFICANT CHANGE UP (ref 3.5–5.3)
POTASSIUM SERPL-SCNC: 4.4 MMOL/L — SIGNIFICANT CHANGE UP (ref 3.5–5.3)
RBC # BLD: 4.65 M/UL — SIGNIFICANT CHANGE UP (ref 3.8–5.2)
RBC # FLD: 13.3 % — SIGNIFICANT CHANGE UP (ref 10.3–14.5)
RH IG SCN BLD-IMP: POSITIVE — SIGNIFICANT CHANGE UP
SODIUM SERPL-SCNC: 143 MMOL/L — SIGNIFICANT CHANGE UP (ref 135–145)
WBC # BLD: 7.63 K/UL — SIGNIFICANT CHANGE UP (ref 3.8–10.5)
WBC # FLD AUTO: 7.63 K/UL — SIGNIFICANT CHANGE UP (ref 3.8–10.5)

## 2024-01-22 PROCEDURE — 93016 CV STRESS TEST SUPVJ ONLY: CPT | Mod: GC

## 2024-01-22 PROCEDURE — 78451 HT MUSCLE IMAGE SPECT SING: CPT | Mod: 26

## 2024-01-22 PROCEDURE — 93018 CV STRESS TEST I&R ONLY: CPT | Mod: GC

## 2024-01-22 PROCEDURE — 99233 SBSQ HOSP IP/OBS HIGH 50: CPT

## 2024-01-22 RX ADMIN — Medication 10 MILLIGRAM(S): at 22:53

## 2024-01-22 RX ADMIN — ENOXAPARIN SODIUM 40 MILLIGRAM(S): 100 INJECTION SUBCUTANEOUS at 13:24

## 2024-01-22 RX ADMIN — Medication 20 MILLIGRAM(S): at 17:31

## 2024-01-22 RX ADMIN — Medication 20 MILLIGRAM(S): at 13:24

## 2024-01-22 RX ADMIN — Medication 20 MILLIGRAM(S): at 21:30

## 2024-01-22 NOTE — PROGRESS NOTE ADULT - ASSESSMENT
Ms. Carey is a 63F with a PMH of spinal tumor s/p excision in 1973 on Baclofen for spasticity presenting with palpitations for 1 day in the setting of SVT requiring adenosine 6mg x 1.    1. 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 Elevated troponin - likely demand in the setting of SVT, troponin peaked to 118    RECOMMENDATIONS:  - Patient consented for SVT ablation plan for tomorrow please make patient NPO after MN  - Ensure type and screen x 2  - Continue Toprol 25mg daily   - TTE normal LVEF and no structural or valvular abnormalities seen  - TSH normal  - Keep Mg > 2 and K > 4  - Monitor on telemetry    Note not final until signed by attending       Maximiliano Puentes MD  Cardiology Fellow PGY-6  Phone: [phone]    For all New Consults  [email or web address]   Login: [employer]   Ms. Carey is a 63F with a PMH of spinal tumor s/p excision in 1973 on Baclofen for spasticity presenting with palpitations for 1 day in the setting of SVT requiring adenosine 6mg x 1.    1. 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 Elevated troponin - likely demand in the setting of SVT, troponin peaked to 118    RECOMMENDATIONS:  - Patient consented for SVT ablation plan for tomorrow please make patient NPO after MN  - Ensure type and screen x 2; hold DVT prophylaxis for the AM   - Continue Toprol 25mg daily   - TTE normal LVEF and no structural or valvular abnormalities seen  - TSH normal  - Keep Mg > 2 and K > 4  - Monitor on telemetry    Note not final until signed by attending       Maximiliano Puentes MD  Cardiology Fellow PGY-6  Phone: [phone]    For all New Consults  [email or web address]   Login: [employer]

## 2024-01-22 NOTE — PROGRESS NOTE ADULT - SUBJECTIVE AND OBJECTIVE BOX
Patient seen and examined at bedside.    Overnight Events:   - No acute events overnight  - At stress lab  - On tele SR 70s-80s  - Denies CP/SOB, palpitations LH/dizziness         Current Meds:  acetaminophen     Tablet .. 650 milliGRAM(s) Oral every 6 hours PRN  baclofen 20 milliGRAM(s) Oral <User Schedule>  bisacodyl Suppository 10 milliGRAM(s) Rectal daily PRN  enoxaparin Injectable 40 milliGRAM(s) SubCutaneous every 24 hours  melatonin 3 milliGRAM(s) Oral at bedtime PRN  metoprolol succinate ER 25 milliGRAM(s) Oral daily      Vitals:  T(F): 97.9 (01-22), Max: 98.3 (01-21)  HR: 65 (01-22) (62 - 86)  BP: 90/60 (01-22) (90/60 - 113/88)  RR: 17 (01-22)  SpO2: 99% (01-22)  I&O's Summary    21 Jan 2024 07:01  -  22 Jan 2024 07:00  --------------------------------------------------------  IN: 600 mL / OUT: 800 mL / NET: -200 mL        Physical Exam:  Appearance: Normal	  HEENT:   Normal oral mucosa, PERRL, EOMI	  Lymphatic: No lymphadenopathy  Cardiovascular: Normal S1 S2, No JVD, No murmurs, No edema  Respiratory: Lungs clear to auscultation	  Psychiatry: A & O x 3, Mood & affect appropriate  Gastrointestinal:  Soft, Non-tender, + BS	  Skin: No rashes, No ecchymoses, No cyanosis	  Neurologic: Non-focal  Extremities: Normal range of motion, No clubbing, cyanosis or edema  Vascular: Peripheral pulses palpable 2+ bilaterally                          11.8   7.63  )-----------( 239      ( 22 Jan 2024 07:20 )             37.2     01-22    143  |  106  |  20  ----------------------------<  98  4.4   |  26  |  0.76    Ca    8.2<L>      22 Jan 2024 07:20  Phos  3.5     01-22  Mg     1.90     01-22        CARDIAC MARKERS ( 17 Jan 2024 07:06 )  76 ng/L / x     / x     / 105 U/L / x     / 4.6 ng/mL  CARDIAC MARKERS ( 17 Jan 2024 00:09 )  109 ng/L / x     / x     / 95 U/L / x     / 4.9 ng/mL  CARDIAC MARKERS ( 16 Jan 2024 19:00 )  118 ng/L / x     / x     / x     / x     / x      CARDIAC MARKERS ( 16 Jan 2024 18:29 )  108 ng/L / x     / x     / x     / x     / x                      Echo:      TRANSTHORACIC ECHOCARDIOGRAM REPORT  ________________________________________________________________________________                                      _______       Pt. Name:       BRIDGER REYNAGA Study Date:    1/18/2024  MRN:            [id number]     YOB: 1960  Accession #:    [id number]     Age:           63 years  Account#:       [id number]      Gender:        F  Heart Rate:                   Height:        64.00 in (162.56 cm)  Rhythm:                       Weight:        180.00 lb (81.65 kg)  Blood Pressure: 114/66 mmHg   BSA/BMI:       1.87 m² / 30.90 kg/m²  ________________________________________________________________________________________  Referring Physician:    [id number] Pancho Angela  Interpreting Physician: Serge Javier MD  Primary Sonographer:    Irene JOLLEY    CPT:               ECHO TTE WO CON COMP W DOPP - 86935.m  Indication(s):     Abnormal electrocardiogram ECG/EKG - R94.31  Procedure:         Transthoracic echocardiogram with 2-D, M-mode and complete                     spectral and color flow Doppler.  Ordering Location: [facility]  Admission Status:  ED  Study Information: Image quality for this study is fair.    _______________________________________________________________________________________     CONCLUSIONS:      1. Left ventricular cavity is normal. Left ventricular wall thickness is normal.   2. Left ventricular endocardium is not well visualized; however, the left ventricular systolic function appears grossly normal.   3. The right ventricle is not well visualized. probably normal systolic function.   4. No significant valvular disease.   5. No pericardial effusion seen.    ________________________________________________________________________________________

## 2024-01-22 NOTE — PROGRESS NOTE ADULT - SUBJECTIVE AND OBJECTIVE BOX
Pancho Angela MD  Interventional Cardiology / Advance Heart Failure and Cardiac Transplant Specialist  [place] Office : [address] [place] [address] [place] [address]  Tel:   [place] Office : [address] [facility] [place] [address]  Tel: [phone]       Pt is lying in bed comfortable not in distress, no chest pains no SOB no palpitations  	  MEDICATIONS:  enoxaparin Injectable 40 milliGRAM(s) SubCutaneous every 24 hours  metoprolol succinate ER 25 milliGRAM(s) Oral daily  acetaminophen     Tablet .. 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      S/P tonsillectomy      S/P ORIF (open reduction internal fixation) fracture  left tibia/fibula fx      S/P arthroscopy of left knee            SOCIAL HISTORY: Substance Use (street drugs): ( x ) never used  (  ) other:    FAMILY HISTORY:  Family history of pulmonary fibrosis (Mother)    Family history of heart attack (Father)           PHYSICAL EXAM:  T(C): 36.5 (24 @ 11:45), Max: 36.8 (24 @ 19:36)  HR: 81 (24 @ 11:45) (65 - 86)  BP: 97/55 (24 @ 11:45) (90/60 - 101/51)  RR: 18 (24 @ 11:45) (17 - 18)  SpO2: 100% (24 @ 11:45) (98% - 100%)  Wt(kg): --  I&O's Summary    2024 07:01  -  2024 07:00  --------------------------------------------------------  IN: 600 mL / OUT: 800 mL / NET: -200 mL             EYES:   PERRLA   ENMT:   Moist mucous membranes, Good dentition, No lesions  Cardiovascular: Normal S1 S2, No JVD, No murmurs, No edema  Respiratory: Lungs clear to auscultation	  Gastrointestinal:  Soft, Non-tender, + BS	  Extremities: no edema                                    11.8   7.63  )-----------( 239      ( 2024 07:20 )             37.2         143  |  106  |  20  ----------------------------<  98  4.4   |  26  |  0.76    Ca    8.2<L>      2024 07:20  Phos  3.5       Mg     1.90           proBNP:   Lipid Profile:   HgA1c:   TSH:     Consultant(s) Notes Reviewed:  [x ] YES  [ ] NO    Care Discussed with Consultants/Other Providers [ x] YES  [ ] NO    Imaging Personally Reviewed independently:  [x] YES  [ ] NO    All labs, radiologic studies, vitals, orders and medications list reviewed. Patient is seen and examined at bedside. Case discussed with medical team.

## 2024-01-22 NOTE — PROGRESS NOTE ADULT - ATTENDING COMMENTS
Ms. Carey is a 63F with a PMH of spinal tumor s/p excision in 1973 on Baclofen for spasticity presenting with palpitations for 1 day in the setting of SVT requiring adenosine 6mg x 1.  Plan for SVT ablation when ready.

## 2024-01-22 NOTE — PROGRESS NOTE ADULT - SUBJECTIVE AND OBJECTIVE BOX
Patient is a 63y old  Female who presents with a chief complaint of palpitations (2024 11:31)      INTERVAL HPI/OVERNIGHT EVENTS: seen and examined, denies any CP , awaiting NST   T(C): 36.8 (24 @ 21:00), Max: 36.8 (24 @ 21:00)  HR: 78 (24 @ 21:00) (65 - 81)  BP: 102/74 (24 @ 21:00) (90/60 - 102/74)  RR: 18 (24 @ 21:00) (17 - 18)  SpO2: 100% (24 @ 21:00) (99% - 100%)  Wt(kg): --  I&O's Summary    2024 07:01  -  2024 07:00  --------------------------------------------------------  IN: 600 mL / OUT: 800 mL / NET: -200 mL        PAST MEDICAL & SURGICAL HISTORY:  Kidney stone      Carpal tunnel syndrome on both sides      Paralysis  from waist down due to spinal surgery      Myelopathy of thoracic region        H/O:       S/P spinal surgery  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      S/P tonsillectomy      S/P ORIF (open reduction internal fixation) fracture  left tibia/fibula fx      S/P arthroscopy of left knee            SOCIAL HISTORY  Alcohol:  Tobacco:  Illicit substance use:    FAMILY HISTORY:    REVIEW OF SYSTEMS:  CONSTITUTIONAL: No fever, weight loss, or fatigue  EYES: No eye pain, visual disturbances, or discharge  ENMT:  No difficulty hearing, tinnitus, vertigo; No sinus or throat pain  NECK: No pain or stiffness  RESPIRATORY: No cough, wheezing, chills or hemoptysis; No shortness of breath  CARDIOVASCULAR: No chest pain, palpitations, dizziness, or leg swelling  GASTROINTESTINAL: No abdominal or epigastric pain. No nausea, vomiting, or hematemesis; No diarrhea or constipation. No melena or hematochezia.  GENITOURINARY: No dysuria, frequency, hematuria, or incontinence  NEUROLOGICAL: No headaches, memory loss, loss of strength, numbness, or tremors  SKIN: No itching, burning, rashes, or lesions   LYMPH NODES: No enlarged glands  ENDOCRINE: No heat or cold intolerance; No hair loss  MUSCULOSKELETAL: No joint pain or swelling; No muscle, back, or extremity pain  PSYCHIATRIC: No depression, anxiety, mood swings, or difficulty sleeping  HEME/LYMPH: No easy bruising, or bleeding gums  ALLERY AND IMMUNOLOGIC: No hives or eczema    RADIOLOGY & ADDITIONAL TESTS:    Imaging Personally Reviewed:  [ ] YES  [ ] NO    Consultant(s) Notes Reviewed:  [ ] YES  [ ] NO    PHYSICAL EXAM:  GENERAL: NAD, well-groomed, well-developed  HEAD:  Atraumatic, Normocephalic  EYES: EOMI, PERRLA, conjunctiva and sclera clear  ENMT: No tonsillar erythema, exudates, or enlargement; Moist mucous membranes, Good dentition, No lesions  NECK: Supple, No JVD, Normal thyroid  NERVOUS SYSTEM:  Alert & Oriented X3, Good concentration; Motor Strength 5/5 B/L upper and lower extremities; DTRs 2+ intact and symmetric  CHEST/LUNG: Clear to percussion bilaterally; No rales, rhonchi, wheezing, or rubs  HEART: Regular rate and rhythm; No murmurs, rubs, or gallops  ABDOMEN: Soft, Nontender, Nondistended; Bowel sounds present  EXTREMITIES:  2+ Peripheral Pulses, No clubbing, cyanosis, or edema  LYMPH: No lymphadenopathy noted  SKIN: No rashes or lesions    LABS:                        11.8   7.63  )-----------( 239      ( 2024 07:20 )             37.2         143  |  106  |  20  ----------------------------<  98  4.4   |  26  |  0.76    Ca    8.2<L>      2024 07:20  Phos  3.5       Mg     1.90             Urinalysis Basic - ( 2024 07:20 )    Color: x / Appearance: x / SG: x / pH: x  Gluc: 98 mg/dL / Ketone: x  / Bili: x / Urobili: x   Blood: x / Protein: x / Nitrite: x   Leuk Esterase: x / RBC: x / WBC x   Sq Epi: x / Non Sq Epi: x / Bacteria: x      CAPILLARY BLOOD GLUCOSE            Urinalysis Basic - ( 2024 07:20 )    Color: x / Appearance: x / SG: x / pH: x  Gluc: 98 mg/dL / Ketone: x  / Bili: x / Urobili: x   Blood: x / Protein: x / Nitrite: x   Leuk Esterase: x / RBC: x / WBC x   Sq Epi: x / Non Sq Epi: x / Bacteria: x        MEDICATIONS  (STANDING):  baclofen 20 milliGRAM(s) Oral <User Schedule>  enoxaparin Injectable 40 milliGRAM(s) SubCutaneous every 24 hours  metoprolol succinate ER 25 milliGRAM(s) Oral daily    MEDICATIONS  (PRN):  acetaminophen     Tablet .. 650 milliGRAM(s) Oral every 6 hours PRN Temp greater or equal to 38C (100.4F), Mild Pain (1 - 3)  bisacodyl Suppository 10 milliGRAM(s) Rectal daily PRN Constipation  melatonin 3 milliGRAM(s) Oral at bedtime PRN Insomnia      Care Discussed with Consultants/Other Providers [ ] YES  [ ] NO

## 2024-01-22 NOTE — PROVIDER CONTACT NOTE (OTHER) - ASSESSMENT
Pt BP 90/60, pulse 65. Pt resting in bed, asymptomatic. Denies chest pain, SOB discomfort or pain at this moment.

## 2024-01-23 LAB
ANION GAP SERPL CALC-SCNC: 8 MMOL/L — SIGNIFICANT CHANGE UP (ref 7–14)
BLD GP AB SCN SERPL QL: NEGATIVE — SIGNIFICANT CHANGE UP
BUN SERPL-MCNC: 21 MG/DL — SIGNIFICANT CHANGE UP (ref 7–23)
CALCIUM SERPL-MCNC: 8.2 MG/DL — LOW (ref 8.4–10.5)
CHLORIDE SERPL-SCNC: 106 MMOL/L — SIGNIFICANT CHANGE UP (ref 98–107)
CO2 SERPL-SCNC: 26 MMOL/L — SIGNIFICANT CHANGE UP (ref 22–31)
CREAT SERPL-MCNC: 0.78 MG/DL — SIGNIFICANT CHANGE UP (ref 0.5–1.3)
EGFR: 86 ML/MIN/1.73M2 — SIGNIFICANT CHANGE UP
GLUCOSE SERPL-MCNC: 95 MG/DL — SIGNIFICANT CHANGE UP (ref 70–99)
HCT VFR BLD CALC: 35.1 % — SIGNIFICANT CHANGE UP (ref 34.5–45)
HGB BLD-MCNC: 11.2 G/DL — LOW (ref 11.5–15.5)
MAGNESIUM SERPL-MCNC: 2 MG/DL — SIGNIFICANT CHANGE UP (ref 1.6–2.6)
MCHC RBC-ENTMCNC: 25.6 PG — LOW (ref 27–34)
MCHC RBC-ENTMCNC: 31.9 GM/DL — LOW (ref 32–36)
MCV RBC AUTO: 80.3 FL — SIGNIFICANT CHANGE UP (ref 80–100)
NRBC # BLD: 0 /100 WBCS — SIGNIFICANT CHANGE UP (ref 0–0)
NRBC # FLD: 0 K/UL — SIGNIFICANT CHANGE UP (ref 0–0)
PHOSPHATE SERPL-MCNC: 3.2 MG/DL — SIGNIFICANT CHANGE UP (ref 2.5–4.5)
PLATELET # BLD AUTO: 244 K/UL — SIGNIFICANT CHANGE UP (ref 150–400)
POTASSIUM SERPL-MCNC: 4.3 MMOL/L — SIGNIFICANT CHANGE UP (ref 3.5–5.3)
POTASSIUM SERPL-SCNC: 4.3 MMOL/L — SIGNIFICANT CHANGE UP (ref 3.5–5.3)
RBC # BLD: 4.37 M/UL — SIGNIFICANT CHANGE UP (ref 3.8–5.2)
RBC # FLD: 13.5 % — SIGNIFICANT CHANGE UP (ref 10.3–14.5)
RH IG SCN BLD-IMP: POSITIVE — SIGNIFICANT CHANGE UP
SODIUM SERPL-SCNC: 140 MMOL/L — SIGNIFICANT CHANGE UP (ref 135–145)
WBC # BLD: 7.32 K/UL — SIGNIFICANT CHANGE UP (ref 3.8–10.5)
WBC # FLD AUTO: 7.32 K/UL — SIGNIFICANT CHANGE UP (ref 3.8–10.5)

## 2024-01-23 PROCEDURE — 93010 ELECTROCARDIOGRAM REPORT: CPT

## 2024-01-23 PROCEDURE — 93653 COMPRE EP EVAL TX SVT: CPT

## 2024-01-23 PROCEDURE — 93623 PRGRMD STIMJ&PACG IV RX NFS: CPT | Mod: 26

## 2024-01-23 PROCEDURE — 99203 OFFICE O/P NEW LOW 30 MIN: CPT | Mod: 25

## 2024-01-23 RX ADMIN — Medication 20 MILLIGRAM(S): at 20:53

## 2024-01-23 RX ADMIN — Medication 25 MILLIGRAM(S): at 06:27

## 2024-01-23 RX ADMIN — Medication 20 MILLIGRAM(S): at 12:16

## 2024-01-23 RX ADMIN — Medication 20 MILLIGRAM(S): at 08:36

## 2024-01-23 RX ADMIN — ENOXAPARIN SODIUM 40 MILLIGRAM(S): 100 INJECTION SUBCUTANEOUS at 12:17

## 2024-01-23 NOTE — PROGRESS NOTE ADULT - SUBJECTIVE AND OBJECTIVE BOX
Patient is a 63y old  Female who presents with a chief complaint of Supraventricular tachycardia     (2024 12:12)      INTERVAL HPI/OVERNIGHT EVENTS: s/p ablation   T(C): 36.4 (24 @ 11:35), Max: 36.7 (24 @ 06:20)  HR: 63 (24 @ 11:35) (63 - 70)  BP: 111/62 (24 @ 11:35) (108/66 - 111/62)  RR: 18 (24 @ 11:35) (18 - 18)  SpO2: 100% (24 @ 11:35) (100% - 100%)  Wt(kg): --  I&O's Summary      PAST MEDICAL & SURGICAL HISTORY:  Kidney stone      Carpal tunnel syndrome on both sides      Paralysis  from waist down due to spinal surgery      Myelopathy of thoracic region        H/O:       S/P spinal surgery  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      S/P tonsillectomy      S/P ORIF (open reduction internal fixation) fracture  left tibia/fibula fx      S/P arthroscopy of left knee            SOCIAL HISTORY  Alcohol:  Tobacco:  Illicit substance use:    FAMILY HISTORY:    REVIEW OF SYSTEMS:  CONSTITUTIONAL: No fever, weight loss, or fatigue  EYES: No eye pain, visual disturbances, or discharge  ENMT:  No difficulty hearing, tinnitus, vertigo; No sinus or throat pain  NECK: No pain or stiffness  RESPIRATORY: No cough, wheezing, chills or hemoptysis; No shortness of breath  CARDIOVASCULAR: No chest pain, palpitations, dizziness, or leg swelling  GASTROINTESTINAL: No abdominal or epigastric pain. No nausea, vomiting, or hematemesis; No diarrhea or constipation. No melena or hematochezia.  GENITOURINARY: No dysuria, frequency, hematuria, or incontinence  NEUROLOGICAL: No headaches, memory loss, loss of strength, numbness, or tremors  SKIN: No itching, burning, rashes, or lesions   LYMPH NODES: No enlarged glands  ENDOCRINE: No heat or cold intolerance; No hair loss  MUSCULOSKELETAL: No joint pain or swelling; No muscle, back, or extremity pain  PSYCHIATRIC: No depression, anxiety, mood swings, or difficulty sleeping  HEME/LYMPH: No easy bruising, or bleeding gums  ALLERY AND IMMUNOLOGIC: No hives or eczema    RADIOLOGY & ADDITIONAL TESTS:    Imaging Personally Reviewed:  [ ] YES  [ ] NO    Consultant(s) Notes Reviewed:  [ ] YES  [ ] NO    PHYSICAL EXAM:  GENERAL: NAD, well-groomed, well-developed  HEAD:  Atraumatic, Normocephalic  EYES: EOMI, PERRLA, conjunctiva and sclera clear  ENMT: No tonsillar erythema, exudates, or enlargement; Moist mucous membranes, Good dentition, No lesions  NECK: Supple, No JVD, Normal thyroid  NERVOUS SYSTEM:  Alert & Oriented X3, Good concentration; Motor Strength 5/5 B/L upper and lower extremities; DTRs 2+ intact and symmetric  CHEST/LUNG: Clear to percussion bilaterally; No rales, rhonchi, wheezing, or rubs  HEART: Regular rate and rhythm; No murmurs, rubs, or gallops  ABDOMEN: Soft, Nontender, Nondistended; Bowel sounds present  EXTREMITIES:  2+ Peripheral Pulses, No clubbing, cyanosis, or edema  LYMPH: No lymphadenopathy noted  SKIN: No rashes or lesions    LABS:                        11.2   7.32  )-----------( 244      ( 2024 04:59 )             35.1     -    140  |  106  |  21  ----------------------------<  95  4.3   |  26  |  0.78    Ca    8.2<L>      2024 04:59  Phos  3.2     -  Mg     2.00     -        Urinalysis Basic - ( 2024 04:59 )    Color: x / Appearance: x / SG: x / pH: x  Gluc: 95 mg/dL / Ketone: x  / Bili: x / Urobili: x   Blood: x / Protein: x / Nitrite: x   Leuk Esterase: x / RBC: x / WBC x   Sq Epi: x / Non Sq Epi: x / Bacteria: x      CAPILLARY BLOOD GLUCOSE            Urinalysis Basic - ( 2024 04:59 )    Color: x / Appearance: x / SG: x / pH: x  Gluc: 95 mg/dL / Ketone: x  / Bili: x / Urobili: x   Blood: x / Protein: x / Nitrite: x   Leuk Esterase: x / RBC: x / WBC x   Sq Epi: x / Non Sq Epi: x / Bacteria: x        MEDICATIONS  (STANDING):  baclofen 20 milliGRAM(s) Oral <User Schedule>  enoxaparin Injectable 40 milliGRAM(s) SubCutaneous every 24 hours  metoprolol succinate ER 25 milliGRAM(s) Oral daily    MEDICATIONS  (PRN):  acetaminophen     Tablet .. 650 milliGRAM(s) Oral every 6 hours PRN Temp greater or equal to 38C (100.4F), Mild Pain (1 - 3)  bisacodyl Suppository 10 milliGRAM(s) Rectal daily PRN Constipation  melatonin 3 milliGRAM(s) Oral at bedtime PRN Insomnia      Care Discussed with Consultants/Other Providers [ ] YES  [ ] NO

## 2024-01-23 NOTE — PROGRESS NOTE ADULT - ASSESSMENT
63F spinal tumor excision with spasticity on baclofen presenting with palpitations x1 day. Found to have SVT (1st episode) resolved s/p adenosine      # SVT (supraventricular tachycardia) / palpitation   s/p ablation today   c/w lopressor   EP following     Chest pain :   now resolved   2/2 SVT   likely demand ischemia   -echo / NST   seen by cardio     #Muscle spasticity.   ·  Plan: Takes baclofen 20mg @ 8AM, 40mg @ noon and 20mg at 8PM  -pain control as ordered    dispo: s/p ablation , if cleared by cardio , d/c planning

## 2024-01-23 NOTE — PROGRESS NOTE ADULT - SUBJECTIVE AND OBJECTIVE BOX
Patient seen and examined at bedside.    Overnight Events:   - No acute events overnight  - Went to stress lab for stress images today  - Denies CP/SOB, palpitations  - On tele NSR 60-70s      Current Meds:  acetaminophen     Tablet .. 650 milliGRAM(s) Oral every 6 hours PRN  baclofen 20 milliGRAM(s) Oral <User Schedule>  bisacodyl Suppository 10 milliGRAM(s) Rectal daily PRN  enoxaparin Injectable 40 milliGRAM(s) SubCutaneous every 24 hours  melatonin 3 milliGRAM(s) Oral at bedtime PRN  metoprolol succinate ER 25 milliGRAM(s) Oral daily      Vitals:  T(F): 98 (01-23), Max: 98.2 (01-22)  HR: 70 (01-23) (70 - 81)  BP: 108/66 (01-23) (97/55 - 108/66)  RR: 18 (01-23)  SpO2: 100% (01-23)  I&O's Summary      Physical Exam:  Appearance: Normal	  HEENT:   Normal oral mucosa, PERRL, EOMI	  Lymphatic: No lymphadenopathy  Cardiovascular: Normal S1 S2, No JVD, No murmurs, No edema  Respiratory: Lungs clear to auscultation	  Psychiatry: A & O x 3, Mood & affect appropriate  Gastrointestinal:  Soft, Non-tender, + BS	  Skin: No rashes, No ecchymoses, No cyanosis	  Neurologic: Non-focal  Extremities: Normal range of motion, No clubbing, cyanosis or edema  Vascular: Peripheral pulses palpable 2+ bilaterally                          11.2   7.32  )-----------( 244      ( 23 Jan 2024 04:59 )             35.1     01-23    140  |  106  |  21  ----------------------------<  95  4.3   |  26  |  0.78    Ca    8.2<L>      23 Jan 2024 04:59  Phos  3.2     01-23  Mg     2.00     01-23        CARDIAC MARKERS ( 17 Jan 2024 07:06 )  76 ng/L / x     / x     / 105 U/L / x     / 4.6 ng/mL  CARDIAC MARKERS ( 17 Jan 2024 00:09 )  109 ng/L / x     / x     / 95 U/L / x     / 4.9 ng/mL  CARDIAC MARKERS ( 16 Jan 2024 19:00 )  118 ng/L / x     / x     / x     / x     / x      CARDIAC MARKERS ( 16 Jan 2024 18:29 )  108 ng/L / x     / x     / x     / x     / x                    Echo:      TRANSTHORACIC ECHOCARDIOGRAM REPORT  ________________________________________________________________________________                                      _______       Pt. Name:       BRIDGER REYNAGA Study Date:    1/18/2024  MRN:            [id number]     YOB: 1960  Accession #:    [id number]     Age:           63 years  Account#:       [id number]      Gender:        F  Heart Rate:                   Height:        64.00 in (162.56 cm)  Rhythm:                       Weight:        180.00 lb (81.65 kg)  Blood Pressure: 114/66 mmHg   BSA/BMI:       1.87 m² / 30.90 kg/m²  ________________________________________________________________________________________  Referring Physician:    [id number] Pancho Angela  Interpreting Physician: Serge Javier MD  Primary Sonographer:    Irene JOLLEY    CPT:               ECHO TTE WO CON COMP W DOPP - 66458.m  Indication(s):     Abnormal electrocardiogram ECG/EKG - R94.31  Procedure:         Transthoracic echocardiogram with 2-D, M-mode and complete                     spectral and color flow Doppler.  Ordering Location: [facility]  Admission Status:  ED  Study Information: Image quality for this study is fair.    _______________________________________________________________________________________     CONCLUSIONS:      1. Left ventricular cavity is normal. Left ventricular wall thickness is normal.   2. Left ventricular endocardium is not well visualized; however, the left ventricular systolic function appears grossly normal.   3. The right ventricle is not well visualized. probably normal systolic function.   4. No significant valvular disease.   5. No pericardial effusion seen.    ________________________________________________________________________________________

## 2024-01-23 NOTE — DIETITIAN INITIAL EVALUATION ADULT - ADD RECOMMEND
1. Resume po diet- DASH/TLC, when medically feasible.   2. Monitor weight, labs, po intake and tolerance, bowel movement, skin integrity.

## 2024-01-23 NOTE — PROGRESS NOTE ADULT - NS ATTEND AMEND GEN_ALL_CORE FT
Ms. Carey is a 63F with a PMH of spinal tumor s/p excision in 1973 on Baclofen for spasticity presenting with palpitations for 1 day in the setting of SVT requiring adenosine 6mg x 1. Plan for SVT ablation today.

## 2024-01-23 NOTE — PROGRESS NOTE ADULT - ASSESSMENT
EKG SVT likelt AVNRT     Assessment and Plan     1) SVT : resolved with adenosine   - monitor on tele   - echo grossly normal LV   - c/w metoprolol   - EP on board  for ablation    2) HTN c/w metoprolol        3) NSTEMI  - Denies CP , likely 2/2 demand   - echo normal   - get nuclear stress     DVT PPX   Lovenox

## 2024-01-23 NOTE — DIETITIAN INITIAL EVALUATION ADULT - PERTINENT MEDS FT
MEDICATIONS  (STANDING):  baclofen 20 milliGRAM(s) Oral <User Schedule>  enoxaparin Injectable 40 milliGRAM(s) SubCutaneous every 24 hours  metoprolol succinate ER 25 milliGRAM(s) Oral daily    MEDICATIONS  (PRN):  acetaminophen     Tablet .. 650 milliGRAM(s) Oral every 6 hours PRN Temp greater or equal to 38C (100.4F), Mild Pain (1 - 3)  bisacodyl Suppository 10 milliGRAM(s) Rectal daily PRN Constipation  melatonin 3 milliGRAM(s) Oral at bedtime PRN Insomnia

## 2024-01-23 NOTE — PROGRESS NOTE ADULT - ASSESSMENT
Ms. Carey is a 63F with a PMH of spinal tumor s/p excision in 1973 on Baclofen for spasticity presenting with palpitations for 1 day in the setting of SVT requiring adenosine 6mg x 1.    1. 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 Elevated troponin - likely demand in the setting of SVT, troponin peaked to 118    RECOMMENDATIONS:  - NPO plan for SVT ablation today   - Ensure type and screen x 2; hold DVT prophylaxis for the AM   - Continue Toprol 25mg daily   - NST results pending  - TTE normal LVEF and no structural or valvular abnormalities seen  - TSH normal  - Keep Mg > 2 and K > 4  - Monitor on telemetry    Note not final until signed by attending       Maximiliano Puentes MD  Cardiology Fellow PGY-6  Phone: [phone]    For all New Consults  [email or web address]   Login: [email or web address]

## 2024-01-23 NOTE — DIETITIAN INITIAL EVALUATION ADULT - OTHER INFO
63F spinal tumor excision with spasticity on baclofen presenting with palpitations x1 day. Found to have SVT (1st episode) resolved s/p adenosine, per chart.     Patient is NPO for procedure during visit. Patient was on a DASH/TLC diet prior. Per RN flow sheet, patient was consuming % of meals, prior to NPO. Patient denies any difficulty chewing or swallowing, any GI distress (N/V/D/C) during visit. Last bowel movement 1/22, per RN flow sheet. Current weight: 81.6kg (1/20/2024), 83.9kg (7/25/2023). Noted patient has non-significant, favorable weight loss of -2.3kg/-2.7%BW x 6 months. RD discussed healthy eating tips, heart healthy diet recommendations with patient during visit. Encouraged patient to avoid food high in sodium, saturated/trans fat. Patient is receptive to the information provided.

## 2024-01-23 NOTE — DIETITIAN INITIAL EVALUATION ADULT - PERTINENT LABORATORY DATA
01-23    140  |  106  |  21  ----------------------------<  95  4.3   |  26  |  0.78    Ca    8.2<L>      23 Jan 2024 04:59  Phos  3.2     01-23  Mg     2.00     01-23

## 2024-01-23 NOTE — PROGRESS NOTE ADULT - SUBJECTIVE AND OBJECTIVE BOX
Pancho Angela MD  Interventional Cardiology / Advance Heart Failure and Cardiac Transplant Specialist  [place] Office : [address] [place] [address] [place]. [address]  Tel:   [place] Office : [address] [facility] [place] [address]  Tel: [phone]       Pt is lying in bed comfortable not in distress, no chest pains no SOB no palpitations  	  MEDICATIONS:  enoxaparin Injectable 40 milliGRAM(s) SubCutaneous every 24 hours  metoprolol succinate ER 25 milliGRAM(s) Oral daily  acetaminophen     Tablet .. 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      S/P tonsillectomy      S/P ORIF (open reduction internal fixation) fracture  left tibia/fibula fx      S/P arthroscopy of left knee            SOCIAL HISTORY: Substance Use (street drugs): ( x ) never used  (  ) other:    FAMILY HISTORY:  Family history of pulmonary fibrosis (Mother)    Family history of heart attack (Father)           PHYSICAL EXAM:  T(C): 36.4 (24 @ 11:35), Max: 36.8 (24 @ 21:00)  HR: 63 (24 @ 11:35) (63 - 78)  BP: 111/62 (24 @ 11:35) (102/74 - 111/62)  RR: 18 (24 @ 11:35) (18 - 18)  SpO2: 100% (24 @ 11:35) (100% - 100%)  Wt(kg): --  I&O's Summary           EYES:   PERRLA   ENMT:   Moist mucous membranes, Good dentition, No lesions  Cardiovascular: Normal S1 S2, No JVD, No murmurs, No edema  Respiratory: Lungs clear to auscultation	  Gastrointestinal:  Soft, Non-tender, + BS	  Extremities: no edema                                    11.2   7.32  )-----------( 244      ( 2024 04:59 )             35.1         140  |  106  |  21  ----------------------------<  95  4.3   |  26  |  0.78    Ca    8.2<L>      2024 04:59  Phos  3.2       Mg     2.00           proBNP:   Lipid Profile:   HgA1c:   TSH:     Consultant(s) Notes Reviewed:  [x ] YES  [ ] NO    Care Discussed with Consultants/Other Providers [ x] YES  [ ] NO    Imaging Personally Reviewed independently:  [x] YES  [ ] NO    All labs, radiologic studies, vitals, orders and medications list reviewed. Patient is seen and examined at bedside. Case discussed with medical team.

## 2024-01-23 NOTE — CHART NOTE - NSCHARTNOTEFT_GEN_A_CORE
EP consult placed 2/2 SVT vs AVNRT in field with EMS  they will see that patient
Brief Tele EP Note    EP following for SVT suggestive of AVNRT, likely plan for EPS/ablation    Tele reviewed: NSR 60-70s, no events    Rafiq Solo MD  Cardiology Fellow, PGY6
Right groin site s/p ablation procedure is without hematoma. Mild dried blood noted on dressing, not outside of previously outlined area. Dressing otherwise dry, clean and intact. Vital signs stable. BL femoral and pedal pulses palpable and symmetric. Sensation, strength, and ROM equal bilaterally. Patient denies chest pain, SOB, numbness, and tingling. Will continue to monitor.

## 2024-01-23 NOTE — DIETITIAN INITIAL EVALUATION ADULT - ORAL INTAKE PTA/DIET HISTORY
Patient reports usual body weight 180lbs. Patient states that her weight has been stable, has good appetite PTA. Patent does not follow any special diet at home, reports cassava and clams food allergies, information updated in chart.

## 2024-01-23 NOTE — DIETITIAN INITIAL EVALUATION ADULT - NS FNS DIET ORDER
Diet, NPO after Midnight:      NPO Start Date: 22-Jan-2024,   NPO Start Time: 23:59  Except Medications (01-22-24 @ 18:48) [Active]  Diet, DASH/TLC:   Sodium & Cholesterol Restricted  No Caffeine  No Carbonated Beverages  No Chocolate (01-21-24 @ 10:26) [Active]

## 2024-01-24 ENCOUNTER — TRANSCRIPTION ENCOUNTER (OUTPATIENT)
Age: 64
End: 2024-01-24

## 2024-01-24 VITALS
HEART RATE: 70 BPM | OXYGEN SATURATION: 100 % | SYSTOLIC BLOOD PRESSURE: 110 MMHG | TEMPERATURE: 98 F | DIASTOLIC BLOOD PRESSURE: 60 MMHG | RESPIRATION RATE: 16 BRPM

## 2024-01-24 LAB
ANION GAP SERPL CALC-SCNC: 9 MMOL/L — SIGNIFICANT CHANGE UP (ref 7–14)
BUN SERPL-MCNC: 19 MG/DL — SIGNIFICANT CHANGE UP (ref 7–23)
CALCIUM SERPL-MCNC: 8.3 MG/DL — LOW (ref 8.4–10.5)
CHLORIDE SERPL-SCNC: 108 MMOL/L — HIGH (ref 98–107)
CO2 SERPL-SCNC: 25 MMOL/L — SIGNIFICANT CHANGE UP (ref 22–31)
CREAT SERPL-MCNC: 0.69 MG/DL — SIGNIFICANT CHANGE UP (ref 0.5–1.3)
EGFR: 99 ML/MIN/1.73M2 — SIGNIFICANT CHANGE UP
GLUCOSE SERPL-MCNC: 109 MG/DL — HIGH (ref 70–99)
HCT VFR BLD CALC: 35.4 % — SIGNIFICANT CHANGE UP (ref 34.5–45)
HGB BLD-MCNC: 11.2 G/DL — LOW (ref 11.5–15.5)
MAGNESIUM SERPL-MCNC: 2 MG/DL — SIGNIFICANT CHANGE UP (ref 1.6–2.6)
MCHC RBC-ENTMCNC: 25.7 PG — LOW (ref 27–34)
MCHC RBC-ENTMCNC: 31.6 GM/DL — LOW (ref 32–36)
MCV RBC AUTO: 81.2 FL — SIGNIFICANT CHANGE UP (ref 80–100)
NRBC # BLD: 0 /100 WBCS — SIGNIFICANT CHANGE UP (ref 0–0)
NRBC # FLD: 0 K/UL — SIGNIFICANT CHANGE UP (ref 0–0)
PHOSPHATE SERPL-MCNC: 3.8 MG/DL — SIGNIFICANT CHANGE UP (ref 2.5–4.5)
PLATELET # BLD AUTO: 235 K/UL — SIGNIFICANT CHANGE UP (ref 150–400)
POTASSIUM SERPL-MCNC: 4.3 MMOL/L — SIGNIFICANT CHANGE UP (ref 3.5–5.3)
POTASSIUM SERPL-SCNC: 4.3 MMOL/L — SIGNIFICANT CHANGE UP (ref 3.5–5.3)
RBC # BLD: 4.36 M/UL — SIGNIFICANT CHANGE UP (ref 3.8–5.2)
RBC # FLD: 13.4 % — SIGNIFICANT CHANGE UP (ref 10.3–14.5)
SODIUM SERPL-SCNC: 142 MMOL/L — SIGNIFICANT CHANGE UP (ref 135–145)
WBC # BLD: 6.27 K/UL — SIGNIFICANT CHANGE UP (ref 3.8–10.5)
WBC # FLD AUTO: 6.27 K/UL — SIGNIFICANT CHANGE UP (ref 3.8–10.5)

## 2024-01-24 PROCEDURE — 99231 SBSQ HOSP IP/OBS SF/LOW 25: CPT

## 2024-01-24 RX ORDER — METOPROLOL TARTRATE 50 MG
0.5 TABLET ORAL
Qty: 15 | Refills: 0
Start: 2024-01-24 | End: 2024-02-22

## 2024-01-24 RX ADMIN — Medication 20 MILLIGRAM(S): at 16:17

## 2024-01-24 RX ADMIN — Medication 20 MILLIGRAM(S): at 08:08

## 2024-01-24 RX ADMIN — ENOXAPARIN SODIUM 40 MILLIGRAM(S): 100 INJECTION SUBCUTANEOUS at 12:53

## 2024-01-24 RX ADMIN — Medication 25 MILLIGRAM(S): at 04:42

## 2024-01-24 RX ADMIN — Medication 20 MILLIGRAM(S): at 12:53

## 2024-01-24 NOTE — PROGRESS NOTE ADULT - PROVIDER SPECIALTY LIST ADULT
Cardiology
Cardiology
Electrophysiology
Internal Medicine
Internal Medicine
Cardiology
Internal Medicine
Internal Medicine
Cardiology
Electrophysiology
Electrophysiology
Internal Medicine

## 2024-01-24 NOTE — PHARMACOTHERAPY INTERVENTION NOTE - COMMENTS
Discharge medications reviewed with the patient and her . Current medication schedule was discussed in detail including: medication name, indication, dose, administration times, treatment duration, side effects, and special instructions. Questions and concerns were answered and addressed. Patient demonstrated understanding. Patient provided with educational medication card.    New medications: metoprolol    Lynsey ChanD, [facility]  Clinical Pharmacy Specialist  [id number]

## 2024-01-24 NOTE — DISCHARGE NOTE PROVIDER - CARE PROVIDER_API CALL
Pancho Angela  Interventional Cardiology  [address] [place] [address]  Phone: [phone]  Fax: [phone]  Follow Up Time:     Cata Worthington  Cardiac Electrophysiology  [address] [place] [address], [address]  [place] [address]  Phone: [phone]  Fax: [phone]  Follow Up Time:

## 2024-01-24 NOTE — DISCHARGE NOTE PROVIDER - NSDCMRMEDTOKEN_GEN_ALL_CORE_FT
baclofen 20 mg oral tablet: 1 tab(s) orally takes 20mg in AM, 40mg at noon, 20mg in evening   baclofen 20 mg oral tablet: 1 tab(s) orally takes 20mg in AM, 40mg at noon, 20mg in evening  metoprolol succinate 25 mg oral tablet, extended release: 0.5 tab(s) orally once a day

## 2024-01-24 NOTE — PROGRESS NOTE ADULT - NSPROGADDITIONALINFOA_GEN_ALL_CORE

## 2024-01-24 NOTE — DISCHARGE NOTE PROVIDER - NSDCFUSCHEDAPPT_GEN_ALL_CORE_FT
Cata Worthington  [facility] Physician [facility] [phone]  Scheduled Appointment: 02/26/2024    Raymond Sanchez  [facility] Physician [address]  Scheduled Appointment: 03/21/2024

## 2024-01-24 NOTE — DISCHARGE NOTE PROVIDER - NSDCCPCAREPLAN_GEN_ALL_CORE_FT
PRINCIPAL DISCHARGE DIAGNOSIS  Diagnosis: SVT (supraventricular tachycardia)  Assessment and Plan of Treatment: -palpitations and vision changes for 1 day in the setting of SVT > 200 bpm, Adenosine sensitive. 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 Now s/p ablation. Tolerated the procedure well. Remained in NSR overnight.   - TTE normal LVEF and no structural or valvular abnormalities seen  - TSH normal  - TST-normal LV and RV function. LVEF 60%. Medium sized mild to moderate defect in the lateral and anterolateral walls that are predominantly fixed suggestive of breast attenuation artifact.   RECOMMENDATIONS:  - metoprolol succinate 12.5mg daily  Post procedure ablation teaching done.  Written instructions and contact information provided .   She has a follow-up appointment with Dr. Worthington on Monday 2/26/24 at 10:30am  [address] [place] [address] Oncology [facility] [phone].   Ok from EP for discharge to home.

## 2024-01-24 NOTE — DISCHARGE NOTE PROVIDER - HOSPITAL COURSE
63F with a PMH of spinal tumor s/p excision in 1973 on Baclofen for spasticity presenting with palpitations and vision changes for 1 day in the setting of SVT > 200 bpm, Adenosine sensitive. 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 Now s/p ablation. Tolerated the procedure well. Remained in NSR overnight.   - TTE normal LVEF and no structural or valvular abnormalities seen  - TSH normal  - TST-normal LV and RV function. LVEF 60%. Medium sized mild to moderate defect in the lateral and anterolateral walls that are predominantly fixed suggestive of breast attenuation artifact.     RECOMMENDATIONS:  - Change Toprol to metoprolol 12.5mg daily  - Keep Mg > 2 and K > 4    Post procedure ablation teaching done.  Written instructions and contact information provided .   Will remove right groin dressing when discharged.   All questions answered.   Labs reviewed.  She has a follow-up appointment with Dr. Worthington on Monday 2/26/24 at 10:30am  [facility] [facility] Oncology [facility] [phone].   Ok from EP for discharge to home.    63F with a PMH of spinal tumor s/p excision in 1973 on Baclofen for spasticity presenting with palpitations and vision changes for 1 day in the setting of SVT > 200 bpm, Adenosine sensitive. 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 Now s/p ablation. Tolerated the procedure well. Remained in NSR overnight.   - TTE normal LVEF and no structural or valvular abnormalities seen  - TSH normal  - TST-normal LV and RV function. LVEF 60%. Medium sized mild to moderate defect in the lateral and anterolateral walls that are predominantly fixed suggestive of breast attenuation artifact.     RECOMMENDATIONS:  - metoprolol succinate 12.5mg daily      Post procedure ablation teaching done.  Written instructions and contact information provided .     She has a follow-up appointment with Dr. Worthington on Monday 2/26/24 at 10:30am  4[facility] Oncology [facility] [phone].   Ok from EP for discharge to home.

## 2024-01-24 NOTE — PROGRESS NOTE ADULT - SUBJECTIVE AND OBJECTIVE BOX
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      S/P tonsillectomy      S/P ORIF (open reduction internal fixation) fracture  left tibia/fibula fx      S/P arthroscopy of left knee            SOCIAL HISTORY  Alcohol:  Tobacco:  Illicit substance use:    FAMILY HISTORY:    REVIEW OF SYSTEMS:  CONSTITUTIONAL: No fever, weight loss, or fatigue  EYES: No eye pain, visual disturbances, or discharge  ENMT:  No difficulty hearing, tinnitus, vertigo; No sinus or throat pain  NECK: No pain or stiffness  RESPIRATORY: No cough, wheezing, chills or hemoptysis; No shortness of breath  CARDIOVASCULAR: No chest pain, palpitations, dizziness, or leg swelling  GASTROINTESTINAL: No abdominal or epigastric pain. No nausea, vomiting, or hematemesis; No diarrhea or constipation. No melena or hematochezia.  GENITOURINARY: No dysuria, frequency, hematuria, or incontinence  NEUROLOGICAL: No headaches, memory loss, loss of strength, numbness, or tremors  SKIN: No itching, burning, rashes, or lesions   LYMPH NODES: No enlarged glands  ENDOCRINE: No heat or cold intolerance; No hair loss  MUSCULOSKELETAL: No joint pain or swelling; No muscle, back, or extremity pain  PSYCHIATRIC: No depression, anxiety, mood swings, or difficulty sleeping  HEME/LYMPH: No easy bruising, or bleeding gums  ALLERY AND IMMUNOLOGIC: No hives or eczema    RADIOLOGY & ADDITIONAL TESTS:    Imaging Personally Reviewed:  [ ] YES  [ ] NO    Consultant(s) Notes Reviewed:  [ ] YES  [ ] NO    PHYSICAL EXAM:  GENERAL: NAD, well-groomed, well-developed  HEAD:  Atraumatic, Normocephalic  EYES: EOMI, PERRLA, conjunctiva and sclera clear  ENMT: No tonsillar erythema, exudates, or enlargement; Moist mucous membranes, Good dentition, No lesions  NECK: Supple, No JVD, Normal thyroid  NERVOUS SYSTEM:  Alert & Oriented X3, Good concentration; Motor Strength 5/5 B/L upper and lower extremities; DTRs 2+ intact and symmetric  CHEST/LUNG: Clear to percussion bilaterally; No rales, rhonchi, wheezing, or rubs  HEART: Regular rate and rhythm; No murmurs, rubs, or gallops  ABDOMEN: Soft, Nontender, Nondistended; Bowel sounds present  EXTREMITIES:  2+ Peripheral Pulses, No clubbing, cyanosis, or edema  LYMPH: No lymphadenopathy noted  SKIN: No rashes or lesions    LABS:                        11.2   6.27  )-----------( 235      ( 2024 07:21 )             35.4     -    142  |  108<H>  |  19  ----------------------------<  109<H>  4.3   |  25  |  0.69    Ca    8.3<L>      2024 07:21  Phos  3.8     -  Mg     2.00     -24        Urinalysis Basic - ( 2024 07:21 )    Color: x / Appearance: x / SG: x / pH: x  Gluc: 109 mg/dL / Ketone: x  / Bili: x / Urobili: x   Blood: x / Protein: x / Nitrite: x   Leuk Esterase: x / RBC: x / WBC x   Sq Epi: x / Non Sq Epi: x / Bacteria: x      CAPILLARY BLOOD GLUCOSE            Urinalysis Basic - ( 2024 07:21 )    Color: x / Appearance: x / SG: x / pH: x  Gluc: 109 mg/dL / Ketone: x  / Bili: x / Urobili: x   Blood: x / Protein: x / Nitrite: x   Leuk Esterase: x / RBC: x / WBC x   Sq Epi: x / Non Sq Epi: x / Bacteria: x        MEDICATIONS  (STANDING):  baclofen 20 milliGRAM(s) Oral <User Schedule>  enoxaparin Injectable 40 milliGRAM(s) SubCutaneous every 24 hours  metoprolol succinate ER 25 milliGRAM(s) Oral daily    MEDICATIONS  (PRN):  acetaminophen     Tablet .. 650 milliGRAM(s) Oral every 6 hours PRN Temp greater or equal to 38C (100.4F), Mild Pain (1 - 3)  bisacodyl Suppository 10 milliGRAM(s) Rectal daily PRN Constipation  melatonin 3 milliGRAM(s) Oral at bedtime PRN Insomnia      Care Discussed with Consultants/Other Providers [ ] YES  [ ] NO

## 2024-01-24 NOTE — PROGRESS NOTE ADULT - NS ATTEND AMEND GEN_ALL_CORE FT
63F with a PMH of spinal tumor s/p excision in 1973 on Baclofen for spasticity presenting with palpitations and vision changes for 1 day in the setting of SVT > 200 bpm, Adenosine sensitive. 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 Now s/p ablation. Tolerated the procedure well. Remained in NSR overnight. FU as outpt.

## 2024-01-24 NOTE — PROGRESS NOTE ADULT - ASSESSMENT
63F spinal tumor excision with spasticity on baclofen presenting with palpitations x1 day. Found to have SVT (1st episode) resolved s/p adenosine      # SVT (supraventricular tachycardia) / palpitation   s/p ablation today   c/w lopressor   EP following     Chest pain :   now resolved   2/2 SVT   likely demand ischemia   -echo / NST   seen by cardio     #Muscle spasticity.   ·  Plan: Takes baclofen 20mg @ 8AM, 40mg @ noon and 20mg at 8PM  -pain control as ordered    dispo: s/p ablation , if cleared by cardio , d/c planning home today

## 2024-01-24 NOTE — PROGRESS NOTE ADULT - SUBJECTIVE AND OBJECTIVE BOX
Pancho Angela MD  Interventional Cardiology / [facility] Failure [facility] Cardiac Transplant Specialist  [place] Office : [phone] [place] [address] [place]. [address]  Tel:   [place] Office : [address] [facility] [place] [address]  Tel: [phone]       Pt is lying in bed comfortable not in distress, no chest pains no SOB no palpitations s/p ablation   	  MEDICATIONS:  enoxaparin Injectable 40 milliGRAM(s) SubCutaneous every 24 hours  metoprolol succinate ER 25 milliGRAM(s) Oral daily        acetaminophen     Tablet .. 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      S/P tonsillectomy      S/P ORIF (open reduction internal fixation) fracture  left tibia/fibula fx      S/P arthroscopy of left knee            SOCIAL HISTORY: Substance Use (street drugs): ( x ) never used  (  ) other:    FAMILY HISTORY:  Family history of pulmonary fibrosis (Mother)    Family history of heart attack (Father)         PHYSICAL EXAM:  T(C): 36.7 (24 @ 16:42), Max: 37.2 (24 @ 04:40)  HR: 70 (24 @ 16:42) (69 - 78)  BP: 110/60 (24 @ 16:42) (102/61 - 116/56)  RR: 16 (24 @ 16:42) (16 - 17)  SpO2: 100% (24 @ 16:42) (98% - 100%)  Wt(kg): --  I&O's Summary        EYES:   PERRLA   ENMT:   Moist mucous membranes, Good dentition, No lesions  Cardiovascular: Normal S1 S2, No JVD, No murmurs, No edema  Respiratory: Lungs clear to auscultation	  Gastrointestinal:  Soft, Non-tender, + BS	  Extremities: no edema                                    11.2   6.27  )-----------( 235      ( 2024 07:21 )             35.4         142  |  108<H>  |  19  ----------------------------<  109<H>  4.3   |  25  |  0.69    Ca    8.3<L>      2024 07:21  Phos  3.8       Mg     2.00           proBNP:   Lipid Profile:   HgA1c:   TSH:     Consultant(s) Notes Reviewed:  [x ] YES  [ ] NO    Care Discussed with Consultants/Other Providers [ x] YES  [ ] NO    Imaging Personally Reviewed independently:  [x] YES  [ ] NO    All labs, radiologic studies, vitals, orders and medications list reviewed. Patient is seen and examined at bedside. Case discussed with medical team.

## 2024-01-24 NOTE — PROGRESS NOTE ADULT - REASON FOR ADMISSION
palpitations

## 2024-01-24 NOTE — PROGRESS NOTE ADULT - ASSESSMENT
63F with a PMH of spinal tumor s/p excision in 1973 on Baclofen for spasticity presenting with palpitations and vision changes for 1 day in the setting of SVT > 200 bpm, Adenosine sensitive. 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 Now s/p ablation. Tolerated the procedure well. Remained in NSR overnight.   - TTE normal LVEF and no structural or valvular abnormalities seen  - TSH normal  - TST-normal LV and RV function. LVEF 60%. Medium sized mild to moderate defect in the lateral and anterolateral walls that are predominantly fixed suggestive of breast attenuation artifact.     RECOMMENDATIONS:  - Change Toprol to metoprolol 12.5mg daily  - Keep Mg > 2 and K > 4    Post procedure ablation teaching done.  Written instructions and contact information provided .   Will remove right groin dressing when discharged.   All questions answered.   Labs reviewed.  She has a follow-up appointment with Dr. Worthington on Monday 2/26/24 at 10:30am  [facility]th [facility] [phone].   Ok from EP for discharge to home.

## 2024-01-24 NOTE — PROGRESS NOTE ADULT - ASSESSMENT
EKG SVT likelt AVNRT     Assessment and Plan     1) SVT : resolved with adenosine   - monitor on tele   - echo grossly normal LV   - c/w metoprolol   - EP on board  s/p  ablation    2) HTN c/w metoprolol        3) NSTEMI  - Denies CP , likely 2/2 demand   - echo normal   -  nuclear stress  shows no ischemia     DVT PPX   Lovenox

## 2024-01-24 NOTE — PROGRESS NOTE ADULT - SUBJECTIVE AND OBJECTIVE BOX
Patient is s/p AVNRT ablation. tolerated the procedure well.         Vital Signs Last 24 Hrs  T(C): 37.2 (24 Jan 2024 04:40), Max: 37.2 (24 Jan 2024 04:40)  T(F): 99 (24 Jan 2024 04:40), Max: 99 (24 Jan 2024 04:40)  HR: 72 (24 Jan 2024 04:40) (63 - 78)  BP: 102/61 (24 Jan 2024 04:40) (102/61 - 116/56)  BP(mean): --  RR: 17 (24 Jan 2024 04:40) (17 - 18)  SpO2: 100% (24 Jan 2024 04:40) (100% - 100%)    Parameters below as of 24 Jan 2024 04:40  Patient On (Oxygen Delivery Method): room air          EKG  Telemetry  MEDICATIONS  (STANDING):  baclofen 20 milliGRAM(s) Oral <User Schedule>  enoxaparin Injectable 40 milliGRAM(s) SubCutaneous every 24 hours  metoprolol succinate ER 25 milliGRAM(s) Oral daily    MEDICATIONS  (PRN):  acetaminophen     Tablet .. 650 milliGRAM(s) Oral every 6 hours PRN Temp greater or equal to 38C (100.4F), Mild Pain (1 - 3)  bisacodyl Suppository 10 milliGRAM(s) Rectal daily PRN Constipation  melatonin 3 milliGRAM(s) Oral at bedtime PRN Insomnia          Physical exam:   Gen-  Resp-   CV-   ABD-  EXT-  Neuro                            11.2   6.27  )-----------( 235      ( 24 Jan 2024 07:21 )             35.4       01-23    140  |  106  |  21  ----------------------------<  95  4.3   |  26  |  0.78    Ca    8.2<L>      23 Jan 2024 04:59  Phos  3.2     01-23  Mg     2.00     01-23        < from: TTE W or WO Ultrasound Enhancing Agent (01.18.24 @ 16:57) >  TRANSTHORACIC ECHOCARDIOGRAM REPORT  ________________________________________________________________________________                                      _______       Pt. Name:       BRIDGER REYNAGA Study Date:    1/18/2024  MRN:            [id number]     YOB: 1960  Accession #:    [id number]     Age:           63 years  Account#:       [id number]      Gender:        F  Heart Rate:                   Height:        64.00 in (162.56 cm)  Rhythm:                       Weight:        180.00 lb (81.65 kg)  Blood Pressure: 114/66 mmHg   BSA/BMI:       1.87 m² / 30.90 kg/m²  ________________________________________________________________________________________  Referring Physician:    [id number] Pancho Angela  Interpreting Physician: Serge Javier MD  Primary Sonographer:    Irene JOLLEY    CPT:               ECHO TTE WO CON COMP W DOPP - 42831.m  Indication(s):     Abnormal electrocardiogram ECG/EKG - R94.31  Procedure:         Transthoracic echocardiogram with 2-D, M-mode and complete                     spectral and color flow Doppler.  [facility]  Admission Status:  ED  Study Information: Image quality for this study is fair.    _______________________________________________________________________________________     CONCLUSIONS:      1. Left ventricular cavity is normal. Left ventricular wall thickness is normal.   2. Left ventricular endocardium is not well visualized; however, the left ventricular systolic function appears grossly normal.   3. The right ventricle is not well visualized. probably normal systolic function.   4. No significant valvular disease.   5. No pericardial effusion seen.    ________________________________________________________________________________________  FINDINGS:     Left Ventricle:  The left ventricular cavity is normal. Left ventricular wall thickness is normal. There is normal left ventricular diastolic function, with normal filling pressure. Left ventricular endocardium is not well visualized; however, the left ventricular systolic function appears grossly normal.     Right Ventricle:    < from: TTE W or WO Ultrasound Enhancing Agent (01.18.24 @ 16:57) >  The right ventricle is not well visualized. Normal wall thickness and probably normal systolic function. Tricuspid annular plane systolic excursion (TAPSE) is 2.3cm (normal >=1.7 cm).     Left Atrium:  The left atrium is normal with an indexed volume of 11.60 ml/m².     Right Atrium:  The right atrium is normal in size.     Aortic Valve:  The aortic valve was not well visualized. There is no aortic valve stenosis. There is no evidence of aortic regurgitation.     Mitral Valve:  Structurally normal mitral valve with normal leaflet excursion. There is no mitral valve stenosis. There is trace mitral regurgitation.     Tricuspid Valve:  Structurally normal tricuspid valve with normal leaflet excursion. There is trace tricuspid regurgitation.     Pulmonic Valve:  Structurally normal pulmonic valve with normal leaflet excursion. There is trace pulmonic regurgitation.     Aorta:  The aortic annulus and aortic root appear normal in size.     Pericardium:  No pericardial effusion seen.     Systemic Veins:  The inferior vena cava is normal in size (normal <2.1cm) with normal inspiratory collapse (normal >50%) consistent with normal right atrial pressure (~3, range 0-5mmHg).                   Patient is s/p AVNRT ablation. Tolerated the procedure well. No immediate complications.   States she is feeling well. Denies chest pain, palpitations, dizziness or shortness of breath overnight.   Denies right groin pain.     Vital Signs Last 24 Hrs  T(C): 37.2 (24 Jan 2024 04:40), Max: 37.2 (24 Jan 2024 04:40)  T(F): 99 (24 Jan 2024 04:40), Max: 99 (24 Jan 2024 04:40)  HR: 72 (24 Jan 2024 04:40) (63 - 78)  BP: 102/61 (24 Jan 2024 04:40) (102/61 - 116/56)  BP(mean): --  RR: 17 (24 Jan 2024 04:40) (17 - 18)  SpO2: 100% (24 Jan 2024 04:40) (100% - 100%)    Parameters below as of 24 Jan 2024 04:40  Patient On (Oxygen Delivery Method): room air    Telemetry: Normal sinus rhythm 60-70's.     MEDICATIONS  (STANDING):  baclofen 20 milliGRAM(s) Oral <User Schedule>  enoxaparin Injectable 40 milliGRAM(s) SubCutaneous every 24 hours  metoprolol succinate ER 25 milliGRAM(s) Oral daily    MEDICATIONS  (PRN):  acetaminophen     Tablet .. 650 milliGRAM(s) Oral every 6 hours PRN Temp greater or equal to 38C (100.4F), Mild Pain (1 - 3)  bisacodyl Suppository 10 milliGRAM(s) Rectal daily PRN Constipation  melatonin 3 milliGRAM(s) Oral at bedtime PRN Insomnia          Physical exam:   Gen- well developed well nourished in NAD  Resp- clear to auscultation. No wheezing, rales or rhonchi  CV- S1 and S2 RRR. No murmurs, gallops or rubs  ABD- soft nontender = bowel sounds  EXT- no edema Right groin without bleeding, hematoma or ecchymosis  Neuro: nonfocal                            11.2   6.27  )-----------( 235      ( 24 Jan 2024 07:21 )             35.4       01-23    140  |  106  |  21  ----------------------------<  95  4.3   |  26  |  0.78    Ca    8.2<L>      23 Jan 2024 04:59  Phos  3.2     01-23  Mg     2.00     01-23        < from: TTE W or WO Ultrasound Enhancing Agent (01.18.24 @ 16:57) >  TRANSTHORACIC ECHOCARDIOGRAM REPORT  ________________________________________________________________________________                                      _______       Pt. Name:       BRIDGER REYNAGA Study Date:    1/18/2024  MRN:            [id number]     YOB: 1960  Accession #:    [id number]     Age:           63 years  Account#:       [id number]      Gender:        F  Heart Rate:                   Height:        64.00 in (162.56 cm)  Rhythm:                       Weight:        180.00 lb (81.65 kg)  Blood Pressure: 114/66 mmHg   BSA/BMI:       1.87 m² / 30.90 kg/m²  ________________________________________________________________________________________  Referring Physician:    [id number] Pancho Angela  Interpreting Physician: Serge Javier MD  Primary Sonographer:    Irene JOLLEY    CPT:               ECHO TTE WO CON COMP W DOPP - 60700.m  Indication(s):     Abnormal electrocardiogram ECG/EKG - R94.31  Procedure:         Transthoracic echocardiogram with 2-D, M-mode and complete                     spectral and color flow Doppler.  Ordering Location: [facility]  Admission Status:  ED  Study Information: Image quality for this study is fair.    _______________________________________________________________________________________     CONCLUSIONS:      1. Left ventricular cavity is normal. Left ventricular wall thickness is normal.   2. Left ventricular endocardium is not well visualized; however, the left ventricular systolic function appears grossly normal.   3. The right ventricle is not well visualized. probably normal systolic function.   4. No significant valvular disease.   5. No pericardial effusion seen.    ________________________________________________________________________________________  FINDINGS:     Left Ventricle:  The left ventricular cavity is normal. Left ventricular wall thickness is normal. There is normal left ventricular diastolic function, with normal filling pressure. Left ventricular endocardium is not well visualized; however, the left ventricular systolic function appears grossly normal.     Right Ventricle:    The right ventricle is not well visualized. Normal wall thickness and probably normal systolic function. Tricuspid annular plane systolic excursion (TAPSE) is 2.3cm (normal >=1.7 cm).     Left Atrium:  The left atrium is normal with an indexed volume of 11.60 ml/m².     Right Atrium:  The right atrium is normal in size.     Aortic Valve:  The aortic valve was not well visualized. There is no aortic valve stenosis. There is no evidence of aortic regurgitation.     Mitral Valve:  Structurally normal mitral valve with normal leaflet excursion. There is no mitral valve stenosis. There is trace mitral regurgitation.     Tricuspid Valve:  Structurally normal tricuspid valve with normal leaflet excursion. There is trace tricuspid regurgitation.     Pulmonic Valve:  Structurally normal pulmonic valve with normal leaflet excursion. There is trace pulmonic regurgitation.     Aorta:  The aortic annulus and aortic root appear normal in size.     Pericardium:  No pericardial effusion seen.     Systemic Veins:  The inferior vena cava is normal in size (normal <2.1cm) with normal inspiratory collapse (normal >50%) consistent with normal right atrial pressure (~3, range 0-5mmHg).        < from: Nuclear Stress Test-Pharmacologic.. (01.22.24 @ 10:34) >  Nuclear Pharmacologic Stress Test Report         Patient: BRIDGER REYNAGA                Study Date: 1/22/2024           MRN: [id number]                Birth Date/Age: 1960 Age: 63 years      Access #: [id number]                        Gender: F   Order Loc: L5NM                             Height: 162.6 cm/ 64.0 in  Request Phys: [id number] Panchosajan Angela         Weight: 81.65 kg/ 180.00 lb     Procedure: Stress Pharmacologic           BSA/ BMI: 1.87 m²/ 30.90 kg/m²    Indication: Chest Pain - R07.9        Admiss Status: Inpatient    Fellow/ACP: Jeannie CUEVAS             Fellow/ACP: Jose Eduardo Davis MD    ---------------------------------------------------------------------------------------------------------------------------------------------------------  Procedure Code: STRESS TEST TRACING ONLY - 78770.m;INJECTION REGADENOSON -                  .m;MYOCARDIAL SPECT SINGLE - 06217.m;TC 99M SESTAMIBI PER                  DOSE 2nd - .m    ---------------------------------------------------------------------------------------------------------------------------------------------------------  History:        63 year old female with no significant cardiac history presents                  with palpitations and chest pressure, found to have SVT resolved                  s/p adenosine, planning for ablation, exercise capacity limited                  secondary to spinal tumor excision with spasticity and partial                  paralysis.  Risk Factors:   Presently smoking and Postmenopausal.  Image Quality:  Fair  Artifact:       Breast attenuation and heart not centered FOV. Excellent rest,                  subopitimal post-stres gating.  Study Comments: TTE" 1/18/24, LV cavity is normal. LV wall thickness is normal.                 There is normal LV diastolic function, with normal filling                  pressure. LV systolic function appears grossly normal. The right                  ventricle is not well visualized. Normal wall thickness and                  probably normal systolic function.  Medications:    Toprol XL, Lovenox, Tylenol, Baclofen, Melatonin.  Allergies:      None    --------------------------------------------------------------------------------------------------------------------------------------------------------Conclusions:   1. Normal left ventricular regional wall motion.   2. The left ventricle is normal in function and normal in size. Normal left ventricular diastolic function. The left ventricular EF% during stress is 60 %. The stress end diastolic volume is 105 ml and systolic volume is 42 ml.   3. Normal right ventricular function. There is no right ventricular dilation. RV 49%, RV EDV 74mL, RV ESV 36mL.    Stress Test Results:  Heart Rate: Resting 78 bpm       Pharmacological Stress Test Results:                Protocol: IV regadenoson (bolus injection, 400mcgover 10 to 20                          seconds followed by 5ml flush)          Procedure Time: 4 minutes              Heart Rate: Resting 78 bpm, Stress peak 105 bpm and (66 % max                          predicted).             HR Response: Exaggerated.          Blood Pressure: Resting 113/69 mmHg and Stress max 100/60 mmHg.             BP Response: Normal.  Stress Test Chest Pain: No chest pain during pharmocologic test.  Symptoms During Stress: Abdominal discomfort.  Reason for Termination: Completion of protocol.  < from: Nuclear Stress Test-Pharmacologic.. (01.22.24 @ 10:34) >  Electrocardiogram:  Baseline electrocardiogram: Normal sinus rhythm at a rate of 78 bpm with no  sigificant ST abnomalities.  Stress electrocardiogram: No ischemic ST segment changes.  Arrhythmias: No arrhythmia associated with stress.       Chest Pain:  No chest pain during pharmocologic test.     Heart rate and blood pressure:  The heart rate response for the pharmacologic stress test was exaggerated. The blood pressure response for the pharmacologic stress test was normal.  ---------------------------------------------------------------------------------------------------------------------------------------------------------  Procedure:  7.5 mCi of TC-99M Sestamibi was given intravenously at stress on 1/22/2024 at 10:50:00 AM. After 60 minutes later, gated solid state SPECT images were obtained and assessed for myocardial perfusion and function, with patient in supine position. The patient could not lay prone for attenuation corrected imaging.  ---------------------------------------------------------------------------------------------------------------------------------------------------------  Perfusion:  Qualitative Findings:  There are medium-sized, mild to moderate defect(s) in the lateral and anterolateral walls that are predominantly fixed suggestive of breast attenuation artifact.     Left Ventricular Motion: Normal left ventricular regional wall motion.  ---------------------------------------------------------------------------------------------------------------------------------------------------------     Ventricular Function: The left ventricle is normal in function and normal in size. Normal left ventricular diastolic function. The resting left ventricular EF% is 77 %. The resting end diastolic volume is 82 ml and systolic volume is 19 ml. The post stress left ventricular EF% is 60 %. The post stress end diastolic volume is 105 ml and systolic volume is 42 ml. There is normal right ventricular function. There is no right ventricular dilation. RV 49%, RV EDV 74mL, RV ESV 36mL.     Stress Supervising Provider: Electronically signed by Lucio Naqvi MD  Interpreting Provider: Electronically signed on 1/23/2024 at 1:58:41 PM by Lucio Naqvi MD

## 2024-01-24 NOTE — DISCHARGE NOTE NURSING/CASE MANAGEMENT/SOCIAL WORK - PATIENT PORTAL LINK FT
You can access the FollowMyHealth Patient Portal offered by [facility] by registering at the following website: [email or web address]. By joining [facility]’s FollowMyHealth portal, you will also be able to view your health information using other applications (apps) compatible with our system.

## 2024-02-26 ENCOUNTER — APPOINTMENT (OUTPATIENT)
Dept: ELECTROPHYSIOLOGY | Facility: CLINIC | Age: 64
End: 2024-02-26
Payer: COMMERCIAL

## 2024-02-26 ENCOUNTER — NON-APPOINTMENT (OUTPATIENT)
Age: 64
End: 2024-02-26

## 2024-02-26 VITALS — HEIGHT: 64 IN | WEIGHT: 175 LBS | BODY MASS INDEX: 29.88 KG/M2

## 2024-02-26 VITALS — OXYGEN SATURATION: 100 % | HEART RATE: 63 BPM | DIASTOLIC BLOOD PRESSURE: 74 MMHG | SYSTOLIC BLOOD PRESSURE: 120 MMHG

## 2024-02-26 DIAGNOSIS — Z98.890 OTHER SPECIFIED POSTPROCEDURAL STATES: ICD-10-CM

## 2024-02-26 DIAGNOSIS — Z78.9 OTHER SPECIFIED HEALTH STATUS: ICD-10-CM

## 2024-02-26 DIAGNOSIS — I47.19 OTHER SUPRAVENTRICULAR TACHYCARDIA: ICD-10-CM

## 2024-02-26 DIAGNOSIS — Z86.79 OTHER SPECIFIED POSTPROCEDURAL STATES: ICD-10-CM

## 2024-02-26 PROCEDURE — 99213 OFFICE O/P EST LOW 20 MIN: CPT | Mod: 25

## 2024-02-26 PROCEDURE — 93000 ELECTROCARDIOGRAM COMPLETE: CPT

## 2024-02-27 NOTE — HISTORY OF PRESENT ILLNESS
[FreeTextEntry1] : Vicki Carey is a 62y/o woman with Hx of spinal tumor s/p excision in 1973 on Baclofen for spasticity and recent ER visit for palpitations with noted SVT/AVNRT requiring adenosine and now s/p slow pathway ablation on 1/23/2024 who presents today for routine f/u post ablation. Admits doing well post procedure. Denies chest pain, palpitations, SOB, syncope or near syncope. Right groin without pain, swelling, or bruising.

## 2024-02-27 NOTE — DISCUSSION/SUMMARY
[EKG obtained to assist in diagnosis and management of assessed problem(s)] : EKG obtained to assist in diagnosis and management of assessed problem(s) [FreeTextEntry1] : Impression:  1. AVNRT: s/p slow pathway ablation on 1/23/2024. EKG performed today to assess for presence of conduction disease and reveals NSR.   Will continue f/u with Cardiologist and may RTO as needed or if any new or worsening symptoms or findings occur.

## 2024-02-27 NOTE — CARDIOLOGY SUMMARY
[de-identified] : 2024: normal LV and RV function. LVEF 60%. Medium sized mild to moderate defect in the lateral and anterolateral walls that are predominantly fixed suggestive of breast attenuation artifact. [de-identified] : 2024: normal LVEF and no structural or valvular abnormalities seen

## 2024-03-21 ENCOUNTER — APPOINTMENT (OUTPATIENT)
Dept: PHYSICAL MEDICINE AND REHAB | Facility: CLINIC | Age: 64
End: 2024-03-21
Payer: COMMERCIAL

## 2024-03-21 VITALS
BODY MASS INDEX: 29.88 KG/M2 | OXYGEN SATURATION: 98 % | TEMPERATURE: 97.9 F | HEART RATE: 106 BPM | DIASTOLIC BLOOD PRESSURE: 64 MMHG | WEIGHT: 175 LBS | SYSTOLIC BLOOD PRESSURE: 128 MMHG | RESPIRATION RATE: 18 BRPM | HEIGHT: 64 IN

## 2024-03-21 DIAGNOSIS — M54.16 RADICULOPATHY, LUMBAR REGION: ICD-10-CM

## 2024-03-21 DIAGNOSIS — M47.14 OTHER SPONDYLOSIS WITH MYELOPATHY, THORACIC REGION: ICD-10-CM

## 2024-03-21 DIAGNOSIS — R29.898 OTHER SYMPTOMS AND SIGNS INVOLVING THE MUSCULOSKELETAL SYSTEM: ICD-10-CM

## 2024-03-21 PROCEDURE — 99213 OFFICE O/P EST LOW 20 MIN: CPT

## 2024-04-07 PROBLEM — M54.16 LUMBAR RADICULOPATHY: Status: ACTIVE | Noted: 2021-09-01

## 2024-04-07 PROBLEM — M47.14 THORACIC MYELOPATHY: Status: ACTIVE | Noted: 2021-06-28

## 2024-04-07 PROBLEM — R29.898 WEAKNESS OF BOTH LOWER EXTREMITIES: Status: ACTIVE | Noted: 2023-09-25

## 2024-04-07 NOTE — HISTORY OF PRESENT ILLNESS
[FreeTextEntry1] : The patient follows up in the office today Wearing her new KAFO Orthoses  This is a follow up visit for a 63 year old female who is well know to the practice. 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 She has residual lower extremity weakness with bilateral lower extremity spasticity. The spasticity has paryially controlled with Baclofen 20 mg 1 po qid.  She has difficulty with walking. She uses bilateral T KAFO and  bilateral lofstrand crutches to assist with ambulation.   She received a new MRI of the thoracic spine.  The results are not available for my review. She was told that the cord was thinned at the point of surgery.  No further surgery is anticipated  She lost 25 lbs and the prosthesis no longer support the legs. She has chafing of the skin. She had the Orthotics modified. She still has poor control of the braces. She has chafing at the thigh/knee. She has nearly fallen; She is getting older and feels her legs are little weaker. She finds her presents othotics are too heavy and she is unble to clear her feet. She has more difficulty with going up stairs. She needs to use her hands to flex her hips.   She received her b TKAFO for two weeks ago. Her orthotics are much lighter. She needs assistance with walking  She has been attending  therapy1 x week.  RP is working on gait including increasing her gait stance and foot clearance.She needs to perform a .hep.  She uses the orthotics throughout the home. She resorts to a wheelchair when she tired.  She continues to be gainfully employed  She has difficulty with walking especially going up stairs.  Rising out of the chair has been more difficult recently.  She denies increased back pain.  She still has back pain.  The pain is a spasm across the low back.  This is much improved with her lighter orthoses.  Therapy is also working on increasing her back strength and core strength

## 2024-04-07 NOTE — REVIEW OF SYSTEMS
[Muscle Pain] : muscle pain [Fever] : no fever [Eye Pain] : no eye pain [Earache] : no earache [Chest Pain] : no chest pain [Shortness Of Breath] : no shortness of breath [Abdominal Pain] : no abdominal pain [Dysuria] : no dysuria [Skin Rash] : no skin rash [Dizziness] : no dizziness [Anxiety] : no anxiety [Easy Bruising] : no tendency for easy bruising [de-identified] : thoracic mylopathy with LE spasticity and weakness.

## 2025-02-17 ENCOUNTER — NON-APPOINTMENT (OUTPATIENT)
Age: 65
End: 2025-02-17

## 2025-02-18 ENCOUNTER — RX RENEWAL (OUTPATIENT)
Age: 65
End: 2025-02-18

## 2025-02-19 ENCOUNTER — APPOINTMENT (OUTPATIENT)
Dept: PHYSICAL MEDICINE AND REHAB | Facility: CLINIC | Age: 65
End: 2025-02-19
Payer: COMMERCIAL

## 2025-02-19 VITALS
TEMPERATURE: 98 F | OXYGEN SATURATION: 98 % | SYSTOLIC BLOOD PRESSURE: 118 MMHG | WEIGHT: 180 LBS | DIASTOLIC BLOOD PRESSURE: 70 MMHG | BODY MASS INDEX: 30.73 KG/M2 | HEART RATE: 119 BPM | HEIGHT: 64 IN | RESPIRATION RATE: 18 BRPM

## 2025-02-19 DIAGNOSIS — G25.81 RESTLESS LEGS SYNDROME: ICD-10-CM

## 2025-02-19 DIAGNOSIS — M54.16 RADICULOPATHY, LUMBAR REGION: ICD-10-CM

## 2025-02-19 DIAGNOSIS — M47.14 OTHER SPONDYLOSIS WITH MYELOPATHY, THORACIC REGION: ICD-10-CM

## 2025-02-19 DIAGNOSIS — R29.898 OTHER SYMPTOMS AND SIGNS INVOLVING THE MUSCULOSKELETAL SYSTEM: ICD-10-CM

## 2025-02-19 PROCEDURE — 99215 OFFICE O/P EST HI 40 MIN: CPT

## 2025-02-19 RX ORDER — PREGABALIN 75 MG/1
75 CAPSULE ORAL
Qty: 30 | Refills: 0 | Status: ACTIVE | COMMUNITY
Start: 2025-02-19 | End: 1900-01-01

## 2025-03-19 ENCOUNTER — APPOINTMENT (OUTPATIENT)
Dept: PHYSICAL MEDICINE AND REHAB | Facility: CLINIC | Age: 65
End: 2025-03-19
Payer: COMMERCIAL

## 2025-03-19 VITALS
BODY MASS INDEX: 30.73 KG/M2 | HEIGHT: 64 IN | TEMPERATURE: 97.6 F | DIASTOLIC BLOOD PRESSURE: 68 MMHG | RESPIRATION RATE: 18 BRPM | WEIGHT: 180 LBS | SYSTOLIC BLOOD PRESSURE: 124 MMHG | HEART RATE: 113 BPM | OXYGEN SATURATION: 96 %

## 2025-03-19 DIAGNOSIS — R29.898 OTHER SYMPTOMS AND SIGNS INVOLVING THE MUSCULOSKELETAL SYSTEM: ICD-10-CM

## 2025-03-19 DIAGNOSIS — M47.14 OTHER SPONDYLOSIS WITH MYELOPATHY, THORACIC REGION: ICD-10-CM

## 2025-03-19 DIAGNOSIS — G25.81 RESTLESS LEGS SYNDROME: ICD-10-CM

## 2025-03-19 DIAGNOSIS — M54.16 RADICULOPATHY, LUMBAR REGION: ICD-10-CM

## 2025-03-19 PROCEDURE — 99214 OFFICE O/P EST MOD 30 MIN: CPT

## 2025-04-17 ENCOUNTER — APPOINTMENT (OUTPATIENT)
Dept: PHYSICAL MEDICINE AND REHAB | Facility: CLINIC | Age: 65
End: 2025-04-17

## 2025-05-22 ENCOUNTER — APPOINTMENT (OUTPATIENT)
Dept: PHYSICAL MEDICINE AND REHAB | Facility: CLINIC | Age: 65
End: 2025-05-22
Payer: COMMERCIAL

## 2025-05-22 VITALS
RESPIRATION RATE: 18 BRPM | HEART RATE: 92 BPM | WEIGHT: 182 LBS | BODY MASS INDEX: 31.07 KG/M2 | TEMPERATURE: 97.6 F | OXYGEN SATURATION: 97 % | SYSTOLIC BLOOD PRESSURE: 126 MMHG | HEIGHT: 64 IN | DIASTOLIC BLOOD PRESSURE: 74 MMHG

## 2025-05-22 DIAGNOSIS — G25.81 RESTLESS LEGS SYNDROME: ICD-10-CM

## 2025-05-22 DIAGNOSIS — M47.14 OTHER SPONDYLOSIS WITH MYELOPATHY, THORACIC REGION: ICD-10-CM

## 2025-05-22 DIAGNOSIS — M54.16 RADICULOPATHY, LUMBAR REGION: ICD-10-CM

## 2025-05-22 PROCEDURE — 99213 OFFICE O/P EST LOW 20 MIN: CPT

## 2025-08-21 ENCOUNTER — APPOINTMENT (OUTPATIENT)
Dept: PHYSICAL MEDICINE AND REHAB | Facility: CLINIC | Age: 65
End: 2025-08-21